# Patient Record
Sex: MALE | Race: ASIAN | NOT HISPANIC OR LATINO | Employment: FULL TIME | URBAN - METROPOLITAN AREA
[De-identification: names, ages, dates, MRNs, and addresses within clinical notes are randomized per-mention and may not be internally consistent; named-entity substitution may affect disease eponyms.]

---

## 2017-05-12 ENCOUNTER — OFFICE VISIT (OUTPATIENT)
Dept: FAMILY MEDICINE | Facility: CLINIC | Age: 37
End: 2017-05-12
Payer: COMMERCIAL

## 2017-05-12 VITALS
BODY MASS INDEX: 29.1 KG/M2 | TEMPERATURE: 98.9 F | DIASTOLIC BLOOD PRESSURE: 74 MMHG | SYSTOLIC BLOOD PRESSURE: 110 MMHG | HEIGHT: 68 IN | HEART RATE: 72 BPM | WEIGHT: 192 LBS | RESPIRATION RATE: 16 BRPM

## 2017-05-12 DIAGNOSIS — R09.A2 GLOBUS SENSATION: ICD-10-CM

## 2017-05-12 DIAGNOSIS — Z00.00 ROUTINE GENERAL MEDICAL EXAMINATION AT A HEALTH CARE FACILITY: Primary | ICD-10-CM

## 2017-05-12 DIAGNOSIS — E04.9 GOITER: ICD-10-CM

## 2017-05-12 PROBLEM — Z13.6 CARDIOVASCULAR SCREENING; LDL GOAL LESS THAN 160: Status: ACTIVE | Noted: 2017-05-12

## 2017-05-12 PROCEDURE — 84443 ASSAY THYROID STIM HORMONE: CPT | Performed by: FAMILY MEDICINE

## 2017-05-12 PROCEDURE — 83721 ASSAY OF BLOOD LIPOPROTEIN: CPT | Mod: 59 | Performed by: FAMILY MEDICINE

## 2017-05-12 PROCEDURE — 86376 MICROSOMAL ANTIBODY EACH: CPT | Performed by: FAMILY MEDICINE

## 2017-05-12 PROCEDURE — 36415 COLL VENOUS BLD VENIPUNCTURE: CPT | Performed by: FAMILY MEDICINE

## 2017-05-12 PROCEDURE — 86800 THYROGLOBULIN ANTIBODY: CPT | Performed by: FAMILY MEDICINE

## 2017-05-12 PROCEDURE — 80048 BASIC METABOLIC PNL TOTAL CA: CPT | Performed by: FAMILY MEDICINE

## 2017-05-12 PROCEDURE — 99213 OFFICE O/P EST LOW 20 MIN: CPT | Mod: 25 | Performed by: FAMILY MEDICINE

## 2017-05-12 PROCEDURE — 80061 LIPID PANEL: CPT | Performed by: FAMILY MEDICINE

## 2017-05-12 PROCEDURE — 99395 PREV VISIT EST AGE 18-39: CPT | Performed by: FAMILY MEDICINE

## 2017-05-12 NOTE — PATIENT INSTRUCTIONS
Preventive Health Recommendations  Male Ages 26 - 39    Yearly exam:             See your health care provider every year in order to  o   Review health changes.   o   Discuss preventive care.    o   Review your medicines if your doctor has prescribed any.    You should be tested each year for STDs (sexually transmitted diseases), if you re at risk.     After age 35, talk to your provider about cholesterol testing. If you are at risk for heart disease, have your cholesterol tested at least every 5 years.     If you are at risk for diabetes, you should have a diabetes test (fasting glucose).  Shots: Get a flu shot each year. Get a tetanus shot every 10 years.     Nutrition:    Eat at least 5 servings of fruits and vegetables daily.     Eat whole-grain bread, whole-wheat pasta and brown rice instead of white grains and rice.     Talk to your provider about Calcium and Vitamin D.     Lifestyle    Exercise for at least 150 minutes a week (30 minutes a day, 5 days a week). This will help you control your weight and prevent disease.     Limit alcohol to one drink per day.     No smoking.     Wear sunscreen to prevent skin cancer.     See your dentist every six months for an exam and cleaning.   30 grams fiber (3 servings vegetables /  fruits each meal)  1 serving carbohydrate (bread potatoes) daily

## 2017-05-12 NOTE — MR AVS SNAPSHOT
After Visit Summary   5/12/2017    Anival Galvez    MRN: [id number]           Patient Information     Date Of Birth          1980        Visit Information        Provider Department      5/12/2017 3:30 PM Pilo Hunter MD [facility]        Today's Diagnoses     Routine general medical examination at a health care facility    -  1    Goiter        Globus sensation          Care Instructions      Preventive Health Recommendations  Male Ages 26 - 39    Yearly exam:             See your health care provider every year in order to  o   Review health changes.   o   Discuss preventive care.    o   Review your medicines if your doctor has prescribed any.    You should be tested each year for STDs (sexually transmitted diseases), if you re at risk.     After age 35, talk to your provider about cholesterol testing. If you are at risk for heart disease, have your cholesterol tested at least every 5 years.     If you are at risk for diabetes, you should have a diabetes test (fasting glucose).  Shots: Get a flu shot each year. Get a tetanus shot every 10 years.     Nutrition:    Eat at least 5 servings of fruits and vegetables daily.     Eat whole-grain bread, whole-wheat pasta and brown rice instead of white grains and rice.     Talk to your provider about Calcium and Vitamin D.     Lifestyle    Exercise for at least 150 minutes a week (30 minutes a day, 5 days a week). This will help you control your weight and prevent disease.     Limit alcohol to one drink per day.     No smoking.     Wear sunscreen to prevent skin cancer.     See your dentist every six months for an exam and cleaning.   30 grams fiber (3 servings vegetables /  fruits each meal)  1 serving carbohydrate (bread potatoes) daily          Follow-ups after your visit        Additional Services     GASTROENTEROLOGY ADULT REF PROCEDURE ONLY       Last Lab Result: Creatinine (mg/dL)       Date                     Value                  11/07/2014               0.84             ----------  Body mass index is 29.19 kg/(m^2).     Needed:  No  Language:  English    Patient will be contacted to schedule procedure.     Please be aware that coverage of these services is subject to the terms and limitations of your health insurance plan.  Call member services at your health plan with any benefit or coverage questions.  Any procedures must be performed at a [facility] facility OR coordinated by your clinic's referral office.    Please bring the following with you to your appointment:    (1) Any X-Rays, CTs or MRIs which have been performed.  Contact the facility where they were done to arrange for  prior to your scheduled appointment.    (2) List of current medications   (3) This referral request   (4) Any documents/labs given to you for this referral                  Who to contact     If you have questions or need follow up information about today's clinic visit or your schedule please contact [facility] directly at [phone].  Normal or non-critical lab and imaging results will be communicated to you by [employer], letter or phone within 4 business days after the clinic has received the results. If you do not hear from us within 7 days, please contact the clinic through [employer] or phone. If you have a critical or abnormal lab result, we will notify you by phone as soon as possible.  Submit refill requests through [employer] or call your pharmacy and they will forward the refill request to us. Please allow 3 business days for your refill to be completed.          Additional Information About Your Visit        [employer] Information     [employer] gives you secure access to your electronic health record. If you see a primary care provider, you can also send messages to your care team and make appointments. If you have questions, please call your primary care clinic.  If you do not have a primary care provider, please  "call [phone] and they will assist you.        Care EveryWhere ID     This is your Care EveryWhere ID. This could be used by other organizations to access your [facility] medical records  [id number]        Your Vitals Were     Pulse Temperature Respirations Height BMI (Body Mass Index)       72 98.9  F (37.2  C) (Oral) 16 5' 8\" (1.727 m) 29.19 kg/m2        Blood Pressure from Last 3 Encounters:   05/12/17 110/74   09/07/16 118/64   03/24/16 125/85    Weight from Last 3 Encounters:   05/12/17 192 lb (87.1 kg)   09/07/16 190 lb 12.8 oz (86.5 kg)   03/24/16 187 lb (84.8 kg)              We Performed the Following     ANTI THYROGLOBULIN ANTIBODY     Basic metabolic panel  (Ca, Cl, CO2, Creat, Gluc, K, Na, BUN)     GASTROENTEROLOGY ADULT REF PROCEDURE ONLY     LIPID REFLEX TO DIRECT LDL PANEL     THYROID PEROXIDASE ANTIBODY     TSH with free T4 reflex          Today's Medication Changes          These changes are accurate as of: 5/12/17  3:58 PM.  If you have any questions, ask your nurse or doctor.               These medicines have changed or have updated prescriptions.        Dose/Directions    fluticasone 50 MCG/ACT spray   Commonly known as:  FLONASE   This may have changed:  Another medication with the same name was removed. Continue taking this medication, and follow the directions you see here.   Used for:  Seasonal allergic rhinitis   Changed by:  Alexsander Funes MD        Dose:  1-2 spray   Spray 1-2 sprays into both nostrils daily   Quantity:  16 g   Refills:  11         Stop taking these medicines if you haven't already. Please contact your care team if you have questions.     amoxicillin-clavulanate 875-125 MG per tablet   Commonly known as:  AUGMENTIN   Stopped by:  Pilo Hunter MD           cetirizine 10 MG tablet   Commonly known as:  zyrTEC   Stopped by:  Pilo Hunter MD                    Primary Care Provider    None Specified       No primary provider on file.        Thank you!     Thank you for " choosing [facility]  for your care. Our goal is always to provide you with excellent care. Hearing back from our patients is one way we can continue to improve our services. Please take a few minutes to complete the written survey that you may receive in the mail after your visit with us. Thank you!             Your Updated Medication List - Protect others around you: Learn how to safely use, store and throw away your medicines at www.disposemymeds.org.          This list is accurate as of: 5/12/17  3:58 PM.  Always use your most recent med list.                   Brand Name Dispense Instructions for use    fluticasone 50 MCG/ACT spray    FLONASE    16 g    Spray 1-2 sprays into both nostrils daily

## 2017-05-12 NOTE — PROGRESS NOTES
"  Globus sensation food seems to get stuck lately. He has not noted textures  ROS mo heat cold intolerance  /74 (BP Location: Right arm, Patient Position: Chair, Cuff Size: Adult Regular)  Pulse 72  Temp 98.9  F (37.2  C) (Oral)  Resp 16  Ht 5' 8\" (1.727 m)  Wt 192 lb (87.1 kg)  BMI 29.19 kg/m2  Non nodular goiter  (E04.9) Goiter  Comment: [facility] database  Plan: TSH with free T4 reflex, ANTI THYROGLOBULIN         ANTIBODY, THYROID PEROXIDASE ANTIBODY            (F45.8) Globus sensation  Comment: Discussed, referred  Plan: GASTROENTEROLOGY ADULT REF PROCEDURE ONLY          Pilo Hunter      "

## 2017-05-12 NOTE — NURSING NOTE
"Chief Complaint   Patient presents with     Physical     not fasting for labs       Initial /74 (BP Location: Right arm, Patient Position: Chair, Cuff Size: Adult Regular)  Pulse 72  Temp 98.9  F (37.2  C) (Oral)  Resp 16  Ht 5' 8\" (1.727 m)  Wt 192 lb (87.1 kg)  BMI 29.19 kg/m2 Estimated body mass index is 29.19 kg/(m^2) as calculated from the following:    Height as of this encounter: 5' 8\" (1.727 m).    Weight as of this encounter: 192 lb (87.1 kg).  Medication Reconciliation: complete   Aisha Retana CMA      "

## 2017-05-13 LAB
ANION GAP SERPL CALCULATED.3IONS-SCNC: 8 MMOL/L (ref 3–14)
BUN SERPL-MCNC: 14 MG/DL (ref 7–30)
CALCIUM SERPL-MCNC: 8.9 MG/DL (ref 8.5–10.1)
CHLORIDE SERPL-SCNC: 106 MMOL/L (ref 94–109)
CHOLEST SERPL-MCNC: 252 MG/DL
CO2 SERPL-SCNC: 25 MMOL/L (ref 20–32)
CREAT SERPL-MCNC: 0.85 MG/DL (ref 0.66–1.25)
GFR SERPL CREATININE-BSD FRML MDRD: ABNORMAL ML/MIN/1.7M2
GLUCOSE SERPL-MCNC: 111 MG/DL (ref 70–99)
HDLC SERPL-MCNC: 36 MG/DL
LDLC SERPL CALC-MCNC: ABNORMAL MG/DL
LDLC SERPL DIRECT ASSAY-MCNC: 119 MG/DL
NONHDLC SERPL-MCNC: 216 MG/DL
POTASSIUM SERPL-SCNC: 3.8 MMOL/L (ref 3.4–5.3)
SODIUM SERPL-SCNC: 139 MMOL/L (ref 133–144)
TRIGL SERPL-MCNC: 810 MG/DL
TSH SERPL DL<=0.005 MIU/L-ACNC: 1.22 MU/L (ref 0.4–4)

## 2017-05-15 LAB
THYROGLOB AB SERPL IA-ACNC: <20 IU/ML (ref 0–40)
THYROPEROXIDASE AB SERPL-ACNC: 18 IU/ML

## 2017-05-15 NOTE — PROGRESS NOTES
Thyroid FUNCTION is OK. I would check that yearly. Triglycerides are high. We do not treat at this level. I would check every few years, though. This is associated with diabetes risk  Pilo Hunter MD

## 2017-05-30 ENCOUNTER — TELEPHONE (OUTPATIENT)
Dept: FAMILY MEDICINE | Facility: CLINIC | Age: 37
End: 2017-05-30

## 2017-05-30 NOTE — TELEPHONE ENCOUNTER
Third attempt. Not Scheduled At [facility]. Patient does not want procedure at this time.

## 2017-06-22 DIAGNOSIS — J30.2 SEASONAL ALLERGIC RHINITIS: ICD-10-CM

## 2017-06-23 NOTE — TELEPHONE ENCOUNTER
fluticasone (FLONASE) 50 MCG/ACT nasal spray      Last Written Prescription Date:3/24/16  Last Fill Quantity: 16g,  # refills: 11   Last Office Visit with ZITA, JUDAH or [facility] prescribing provider:   antony 5/12/17

## 2017-06-24 RX ORDER — FLUTICASONE PROPIONATE 50 MCG
SPRAY, SUSPENSION (ML) NASAL
Qty: 16 ML | Refills: 3 | Status: SHIPPED | OUTPATIENT
Start: 2017-06-24 | End: 2018-03-23

## 2017-06-24 NOTE — TELEPHONE ENCOUNTER
Prescription approved per [facility] Refill Protocol  Chelsy Frankel RN BS

## 2018-01-19 ENCOUNTER — OFFICE VISIT (OUTPATIENT)
Dept: FAMILY MEDICINE | Facility: CLINIC | Age: 38
End: 2018-01-19
Payer: COMMERCIAL

## 2018-01-19 VITALS
HEART RATE: 88 BPM | HEIGHT: 68 IN | TEMPERATURE: 98.6 F | SYSTOLIC BLOOD PRESSURE: 128 MMHG | DIASTOLIC BLOOD PRESSURE: 81 MMHG | BODY MASS INDEX: 29.25 KG/M2 | WEIGHT: 193 LBS | OXYGEN SATURATION: 95 %

## 2018-01-19 DIAGNOSIS — J20.9 ACUTE BRONCHITIS, UNSPECIFIED ORGANISM: Primary | ICD-10-CM

## 2018-01-19 PROCEDURE — 99213 OFFICE O/P EST LOW 20 MIN: CPT | Performed by: PHYSICIAN ASSISTANT

## 2018-01-19 RX ORDER — PREDNISONE 20 MG/1
40 TABLET ORAL DAILY
Qty: 10 TABLET | Refills: 0 | Status: SHIPPED | OUTPATIENT
Start: 2018-01-19 | End: 2018-01-24

## 2018-01-19 RX ORDER — ALBUTEROL SULFATE 90 UG/1
2 AEROSOL, METERED RESPIRATORY (INHALATION) EVERY 6 HOURS PRN
Qty: 1 INHALER | Refills: 0 | Status: SHIPPED | OUTPATIENT
Start: 2018-01-19 | End: 2019-12-06

## 2018-01-19 NOTE — PATIENT INSTRUCTIONS

## 2018-01-19 NOTE — PROGRESS NOTES
SUBJECTIVE:   Anival Galvez is a 37 year old male who presents to clinic today for the following health issues:      Acute Illness   Acute illness concerns: Cough   Onset: X 6 days    Fever: no     Chills/Sweats: no     Headache (location?): YES    Sinus Pressure:YES    Conjunctivitis:  no    Ear Pain: no    Rhinorrhea: no     Congestion: YES    Sore Throat: YES- slight     Cough: YES-productive of green sputum    Wheeze: YES    Decreased Appetite: no     Nausea: no     Vomiting: no     Diarrhea:  no     Dysuria/Freq.: no     Fatigue/Achiness: no     Sick/Strep Exposure: no      Therapies Tried and outcome: Cough syrup, this did not help, cough drops for comfort        Problem list and histories reviewed & adjusted, as indicated.  Additional history: as documented    Patient Active Problem List   Diagnosis     Seasonal allergic rhinitis     Osgood-Schlatter's disease     CARDIOVASCULAR SCREENING; LDL GOAL LESS THAN 160     Goiter     Globus sensation     Past Surgical History:   Procedure Laterality Date     wisdom teeth         Social History   Substance Use Topics     Smoking status: Former Smoker     Smokeless tobacco: Never Used     Alcohol use 0.0 oz/week     0 Standard drinks or equivalent per week      Comment: seldom     Family History   Problem Relation Age of Onset     Family History Negative Mother      Family History Negative Father          Current Outpatient Prescriptions   Medication Sig Dispense Refill     predniSONE (DELTASONE) 20 MG tablet Take 2 tablets (40 mg) by mouth daily for 5 days 10 tablet 0     albuterol (PROAIR HFA/PROVENTIL HFA/VENTOLIN HFA) 108 (90 BASE) MCG/ACT Inhaler Inhale 2 puffs into the lungs every 6 hours as needed for shortness of breath / dyspnea or wheezing 1 Inhaler 0     fluticasone (FLONASE) 50 MCG/ACT spray USE ONE OR TWO SPRAYS IN EACH NOSTRIL DAILY 16 mL 3     No Known Allergies  BP Readings from Last 3 Encounters:   01/19/18 128/81   05/12/17 110/74   09/07/16  "118/64    Wt Readings from Last 3 Encounters:   01/19/18 193 lb (87.5 kg)   05/12/17 192 lb (87.1 kg)   09/07/16 190 lb 12.8 oz (86.5 kg)                        Reviewed and updated as needed this visit by clinical staffTobacco  Allergies  Meds  Problems  Med Hx  Surg Hx  Fam Hx  Soc Hx        Reviewed and updated as needed this visit by Provider  Allergies  Meds  Problems         ROS:  Constitutional, HEENT, cardiovascular, pulmonary, gi and gu systems are negative, except as otherwise noted.      OBJECTIVE:   /81 (BP Location: Right arm, Patient Position: Chair, Cuff Size: Adult Large)  Pulse 88  Temp 98.6  F (37  C) (Oral)  Ht 5' 8\" (1.727 m)  Wt 193 lb (87.5 kg)  SpO2 95%  BMI 29.35 kg/m2  Body mass index is 29.35 kg/(m^2).  GENERAL: alert and no distress  EYES: Eyes grossly normal to inspection, PERRL and conjunctivae and sclerae normal  HENT: normal cephalic/atraumatic, both ears: clear effusion, nasal mucosa edematous , oropharynx clear, oral mucous membranes moist and sinuses: not tender  RESP: no rales , no rhonchi and inspiratory wheezes intermittent throughout  CV: regular rates and rhythm, normal S1 S2, no S3 or S4 and no murmur, click or rub  PSYCH: mentation appears normal, affect normal/bright    Diagnostic Test Results:  CXR - negative    ASSESSMENT/PLAN:     (J20.9) Acute bronchitis, unspecified organism  (primary encounter diagnosis)  Comment: CXR obtained due to decreased sats. Negative for infiltrate. Likely viral, however given wheezing and decreased sats, steroid burst will be used as well as albuterol prn for wheezing. If no improvement in 5 days, patient should RTC. Sooner if worsening.   Plan: predniSONE (DELTASONE) 20 MG tablet, albuterol         (PROAIR HFA/PROVENTIL HFA/VENTOLIN HFA) 108 (90        BASE) MCG/ACT Inhaler        -Medication use and side effects discussed with the patient. Patient is in complete understanding and agreement with plan.         Follow up: " as above     Be Nelson PA-C  [facility]

## 2018-01-19 NOTE — MR AVS SNAPSHOT
After Visit Summary   1/19/2018    Anival Galvez    MRN: [id number]           Patient Information     Date Of Birth          1980        Visit Information        Provider Department      1/19/2018 8:30 AM Be Nelson PA-C [facility]        Today's Diagnoses     Acute bronchitis, unspecified organism    -  1      Care Instructions      Bronchitis, Viral (Adult)    You have a viral bronchitis. Bronchitis is inflammation and swelling of the lining of the lungs. This is often caused by an infection. Symptoms include a dry, hacking cough that is worse at night. The cough may bring up yellow-green mucus. You may also feel short of breath or wheeze. Other symptoms may include tiredness, chest discomfort, and chills.  Bronchitis that is caused by a virus is not treated with antibiotics. Instead, medicines may be given to help relieve symptoms. Symptoms can last up to 2 weeks, although the cough may last much longer.  This illness is contagious during the first few days and is spread through the air by coughing and sneezing, or by direct contact (touching the sick person and then touching your own eyes, nose, or mouth).  Most viral illnesses resolve within 10 to 14 days with rest and simple home remedies, although they may sometimes last for several weeks.  Home care    If symptoms are severe, rest at home for the first 2 to 3 days. When you go back to your usual activities, don't let yourself get too tired.    Do not smoke. Also avoid being exposed to secondhand smoke.    You may use over-the-counter medicine to control fever or pain, unless another pain medicine was prescribed. (Note: If you have chronic liver or kidney disease or have ever had a stomach ulcer or gastrointestinal bleeding, talk with your healthcare provider before using these medicines. Also talk to your provider if you are taking medicine to prevent blood clots.) Aspirin should never be given to anyone  younger than 18 years of age who is ill with a viral infection or fever. It may cause severe liver or brain damage.    Your appetite may be poor, so a light diet is fine. Avoid dehydration by drinking 6 to 8 glasses of fluids per day (such as water, soft drinks, sports drinks, juices, tea, or soup). Extra fluids will help loosen secretions in the nose and lungs.    Over-the-counter cough, cold, and sore-throat medicines will not shorten the length of the illness, but they may help to reduce symptoms. (Note: Do not use decongestants if you have high blood pressure.)  Follow-up care  Follow up with your healthcare provider, or as advised. If you had an X-ray or ECG (electrocardiogram), a specialist will review it. You will be notified of any new findings that may affect your care.  Note: If you are age 65 or older, or if you have a chronic lung disease or condition that affects your immune system, or you smoke, talk to your healthcare provider about having pneumococcal vaccinations and a yearly influenza vaccination (flu shot).  When to seek medical advice  Call your healthcare provider right away if any of these occur:    Fever of 100.4 F (38 C) or higher    Coughing up increased amounts of colored sputum    Weakness, drowsiness, headache, facial pain, ear pain, or a stiff neck  Call 911, or get immediate medical care  Contact emergency services right away if any of these occur:    Coughing up blood    Worsening weakness, drowsiness, headache, or stiff neck    Trouble breathing, wheezing, or pain with breathing  Date Last Reviewed: 9/13/2015 2000-2017 [employer]. [address] [place] [address]. All rights reserved. This information is not intended as a substitute for professional medical care. Always follow your healthcare professional's instructions.                Follow-ups after your visit        Who to contact     If you have questions or need follow up information about today's  "clinic visit or your schedule please contact [facility] directly at [phone].  Normal or non-critical lab and imaging results will be communicated to you by [employer], letter or phone within 4 business days after the clinic has received the results. If you do not hear from us within 7 days, please contact the clinic through [employer] or phone. If you have a critical or abnormal lab result, we will notify you by phone as soon as possible.  Submit refill requests through [employer] or call your pharmacy and they will forward the refill request to us. Please allow 3 business days for your refill to be completed.          Additional Information About Your Visit        [employer] Information     [employer] gives you secure access to your electronic health record. If you see a primary care provider, you can also send messages to your care team and make appointments. If you have questions, please call your primary care clinic.  If you do not have a primary care provider, please call [phone] and they will assist you.        Care EveryWhere ID     This is your Care EveryWhere ID. This could be used by other organizations to access your [facility] medical records  [id number]        Your Vitals Were     Pulse Temperature Height Pulse Oximetry BMI (Body Mass Index)       88 98.6  F (37  C) (Oral) 5' 8\" (1.727 m) 95% 29.35 kg/m2        Blood Pressure from Last 3 Encounters:   01/19/18 128/81   05/12/17 110/74   09/07/16 118/64    Weight from Last 3 Encounters:   01/19/18 193 lb (87.5 kg)   05/12/17 192 lb (87.1 kg)   09/07/16 190 lb 12.8 oz (86.5 kg)              Today, you had the following     No orders found for display         Today's Medication Changes          These changes are accurate as of: 1/19/18  8:57 AM.  If you have any questions, ask your nurse or doctor.               Start taking these medicines.        Dose/Directions    albuterol 108 (90 BASE) MCG/ACT Inhaler   Commonly known as:  PROAIR " HFA/PROVENTIL HFA/VENTOLIN HFA   Used for:  Acute bronchitis, unspecified organism   Started by:  Be Nelson PA-C        Dose:  2 puff   Inhale 2 puffs into the lungs every 6 hours as needed for shortness of breath / dyspnea or wheezing   Quantity:  1 Inhaler   Refills:  0       predniSONE 20 MG tablet   Commonly known as:  DELTASONE   Used for:  Acute bronchitis, unspecified organism   Started by:  Be Nelson PA-C        Dose:  40 mg   Take 2 tablets (40 mg) by mouth daily for 5 days   Quantity:  10 tablet   Refills:  0            Where to get your medicines      These medications were sent to [facility] [address] [place] [facility] [place] - [address]  [address] [facility] [address]     Phone:  [phone]     albuterol 108 (90 BASE) MCG/ACT Inhaler    predniSONE 20 MG tablet                Primary Care Provider Fax #    Physician No Ref-Primary [phone]       No address on file        Equal Access to Services     [facility] : Hadii rohan jacobo Sokeysha, waaxda luqadaha, qaybta kaalmada adegueritayagarcía, mauricio cruz . So [facility] [phone].    ATENCIÓN: Si habla español, tiene a elkins disposición servicios gratuitos de asistencia lingüística. Llame al [phone].    We comply with applicable federal civil rights laws and [place] laws. We do not discriminate on the basis of race, color, national origin, age, disability, sex, sexual orientation, or gender identity.            Thank you!     Thank you for choosing [facility]  for your care. Our goal is always to provide you with excellent care. Hearing back from our patients is one way we can continue to improve our services. Please take a few minutes to complete the written survey that you may receive in the mail after your visit with us. Thank you!             Your Updated Medication List - Protect others around you: Learn how to safely use, store and throw away your medicines at  www.disposemymeds.org.          This list is accurate as of: 1/19/18  8:57 AM.  Always use your most recent med list.                   Brand Name Dispense Instructions for use Diagnosis    albuterol 108 (90 BASE) MCG/ACT Inhaler    PROAIR HFA/PROVENTIL HFA/VENTOLIN HFA    1 Inhaler    Inhale 2 puffs into the lungs every 6 hours as needed for shortness of breath / dyspnea or wheezing    Acute bronchitis, unspecified organism       fluticasone 50 MCG/ACT spray    FLONASE    16 mL    USE ONE OR TWO SPRAYS IN EACH NOSTRIL DAILY    Seasonal allergic rhinitis       predniSONE 20 MG tablet    DELTASONE    10 tablet    Take 2 tablets (40 mg) by mouth daily for 5 days    Acute bronchitis, unspecified organism

## 2018-01-19 NOTE — NURSING NOTE
"Chief Complaint   Patient presents with     Cough     suspects bronchitis X 6 days       Initial /81 (BP Location: Right arm, Patient Position: Chair, Cuff Size: Adult Large)  Pulse 88  Temp 98.6  F (37  C) (Oral)  Ht 5' 8\" (1.727 m)  Wt 193 lb (87.5 kg)  SpO2 95%  BMI 29.35 kg/m2 Estimated body mass index is 29.35 kg/(m^2) as calculated from the following:    Height as of this encounter: 5' 8\" (1.727 m).    Weight as of this encounter: 193 lb (87.5 kg).  Medication Reconciliation: complete   Galileo Carballo MA      "

## 2018-03-23 DIAGNOSIS — J30.2 SEASONAL ALLERGIC RHINITIS: ICD-10-CM

## 2018-03-23 RX ORDER — FLUTICASONE PROPIONATE 50 MCG
SPRAY, SUSPENSION (ML) NASAL
Qty: 16 ML | Refills: 8 | Status: SHIPPED | OUTPATIENT
Start: 2018-03-23 | End: 2019-12-06

## 2018-06-22 ENCOUNTER — OFFICE VISIT (OUTPATIENT)
Dept: ORTHOPEDICS | Facility: CLINIC | Age: 38
End: 2018-06-22
Payer: COMMERCIAL

## 2018-06-22 ENCOUNTER — RADIANT APPOINTMENT (OUTPATIENT)
Dept: GENERAL RADIOLOGY | Facility: CLINIC | Age: 38
End: 2018-06-22
Attending: FAMILY MEDICINE
Payer: COMMERCIAL

## 2018-06-22 VITALS
SYSTOLIC BLOOD PRESSURE: 128 MMHG | DIASTOLIC BLOOD PRESSURE: 80 MMHG | BODY MASS INDEX: 28.79 KG/M2 | WEIGHT: 190 LBS | HEIGHT: 68 IN

## 2018-06-22 DIAGNOSIS — M25.561 ARTHRALGIA OF RIGHT LOWER LEG: ICD-10-CM

## 2018-06-22 DIAGNOSIS — M22.2X1 PATELLOFEMORAL PAIN SYNDROME OF RIGHT KNEE: ICD-10-CM

## 2018-06-22 DIAGNOSIS — M22.41 CHONDROMALACIA OF RIGHT PATELLA: ICD-10-CM

## 2018-06-22 DIAGNOSIS — M25.561 ARTHRALGIA OF RIGHT LOWER LEG: Primary | ICD-10-CM

## 2018-06-22 DIAGNOSIS — R29.898 WEAKNESS OF RIGHT HIP: ICD-10-CM

## 2018-06-22 PROCEDURE — 73562 X-RAY EXAM OF KNEE 3: CPT

## 2018-06-22 PROCEDURE — 99203 OFFICE O/P NEW LOW 30 MIN: CPT | Performed by: FAMILY MEDICINE

## 2018-06-22 NOTE — LETTER
6/22/2018         RE: Anival Galvez  [address] [facility] [address]        Dear Colleague,    Thank you for referring your patient, Anival Galvez, to the [facility] SPORTS MEDICINE. Please see a copy of my visit note below.    ASSESSMENT & PLAN    1. Arthralgia of right lower leg    2. Chondromalacia of right patella    3. Patellofemoral pain syndrome of right knee    4. Weakness of right hip      Discussed xray - no significant arthritis  Discussed exam - pain due to weakness in thigh, hip and buttock. Knee is otherwise stable and no fluid in the joint  Activity modification as discussed - squats, lunges, stair, sit-to-stand and kneeling will bother you  Physical therapy: [facility] - [phone]  Can take up to 8 weeks to change your strength  Respect 6/10 pain - before, during, after  Would encourage you to try and start a regular exercise program: minimum of 150 min of moderate intensity exercise and 2 days of weight training    Follow up after 8 PT sessions.    -----    SUBJECTIVE  Anival Galvez is a/an 37 year old male who is seen as a self referral for evaluation of right knee pain. The patient is seen by themselves.    Onset: 1 week(s) ago. Reports insidious onset without acute precipitating event.  Location of Pain: right anterior knee- peripatellar region  Rating of Pain at worst: 4/10  Rating of Pain Currently: 0/10  Worsened by: going down stairs, twisting  Better with: massage  Treatments tried: ice and ibuprofen  Associated symptoms: swelling and crepitus  Orthopedic history:  osgood-schlatter's  Relevant surgical history: NO  Patient Social History: works as an     Exercise: walking but not for exercise    Patient's past medical, surgical, social, and family histories were reviewed today and no changes are noted.    REVIEW OF SYSTEMS:  10 point ROS is negative other than symptoms noted above in HPI, Past Medical History or as stated  "below  Constitutional: NEGATIVE for fever, chills, change in weight  Skin: NEGATIVE for worrisome rashes, moles or lesions  GI/: NEGATIVE for bowel or bladder changes  Neuro: NEGATIVE for weakness, dizziness or paresthesias    OBJECTIVE:  /80  Ht 5' 8\" (1.727 m)  Wt 190 lb (86.2 kg)  BMI 28.89 kg/m2   General: healthy, alert and in no distress  HEENT: no scleral icterus or conjunctival erythema  Skin: no suspicious lesions or rash. No jaundice.  CV: no pedal edema  Resp: normal respiratory effort without conversational dyspnea   Psych: normal mood and affect  Gait: normal steady gait with appropriate coordination and balance  Neuro: Normal light sensory exam of lower extremity  MSK:  RIGHT KNEE  Inspection:    normal alignment, bony hypertrophy of the bilateral tibial tuberosity.  Palpation:    Tender about the lateral patellar facet and medial patellar facet.  Nontender over the patella tendon and tibial tuberosity.  Remainder of bony and ligamentous landmarks are nontender.    No effusion is present    Patellofemoral crepitus is Present  Range of Motion:     00 extension to 1350 flexion  Strength:    Quadriceps and gluteus weakness    Extensor mechanism intact  Special Tests:    Positive: Patellar grind    Negative: MCL/valgus stress (0 & 30 deg), LCL/varus stress (0 & 30 deg), Lachman's, posterior drawer, Halina's    Independent visualization of the below image:  KNEE STANDING AP BILATERAL, SUNRISE BILATERAL, LATERAL RIGHT 6/22/2018  2:06 PM      HISTORY: Arthralgia of right lower leg.     COMPARISON: None.         IMPRESSION: No acute bony abnormality. Fragmented tibial tuberosity  consistent with old Osgood-Schlatter's disease. No joint space  effusion or significant degenerative changes. Two views of the left  knee are included and also show fragmented tibial tuberosity related  to old Osgood-Schlatter's disease but no acute abnormality.     LIGIA MONTANEZ MD    Patient's conditions were " thoroughly discussed during today's visit with greater than 50% of the visit spent counseling the patient with total time spent face-to-face with the patient being 20 minutes.    Dino Muñoz DO [facility]      Again, thank you for allowing me to participate in the care of your patient.        Sincerely,        Dino Muñoz, DO

## 2018-06-22 NOTE — MR AVS SNAPSHOT
After Visit Summary   6/22/2018    Anival Galvez    MRN: [id number]           Patient Information     Date Of Birth          1980        Visit Information        Provider Department      6/22/2018 1:40 PM Dino Muñoz, DO [facility] SP[facility]TS MEDICINE        Today's Diagnoses     Arthralgia of right lower leg    -  1    Chondromalacia of right patella        Patellofemoral pain syndrome of right knee        Weakness of right hip          Care Instructions    1. Arthralgia of right lower leg    2. Chondromalacia of right patella    3. Patellofemoral pain syndrome of right knee    4. Weakness of right hip      Discussed xray - no significant arthritis  Discussed exam - pain due to weakness in thigh, hip and buttock. Knee is otherwise stable and no fluid in the joint  Activity modification as discussed - squats, lunges, stair, sit-to-stand and kneeling will bother you  Physical therapy: [facility] - [phone]  Can take up to 8 weeks to change your strength  Respect 6/10 pain - before, during, after  Would encourage you to try and start a regular exercise program: minimum of 150 min of moderate intensity exercise and 2 days of weight training    Follow up after 8 PT sessions.          Follow-ups after your visit        Additional Services     [facility] PT, HAND, AND CHIROPRACTIC REFERRAL       **This order will print in the [facility] Scheduling Office**    Physical Therapy, Hand Therapy and Chiropractic Care are available through:    *[facility]  *[facility]  *[facility]    Call one number to schedule at any of the above locations: [phone].    Your provider has referred you to: Physical Therapy at [facility] or [facility]    Indication/Reason for Referral: Right PFPS/Chondromalacia patella  Onset of Illness: see chart  Therapy Orders: Evaluate and Treat  Special Programs: None  Special Request: kinesioptaping.     Milton Rose       Additional Comments for the Therapist or Chiropractor: Formal physical therapy - exercises to include neuromuscular/proprioceptive control and VMO/glutues/adductor strengthening. Please also include pain-free closed chain/isometric/isotonic strengthening with use of modalities (including kinesioptaping) as needed/deemed appropriate with home exercise prescription.      Please be aware that coverage of these services is subject to the terms and limitations of your health insurance plan.  Call member services at your health plan with any benefit or coverage questions.      Please bring the following to your appointment:    *Your personal calendar for scheduling future appointments  *Comfortable clothing                  Who to contact     If you have questions or need follow up information about today's clinic visit or your schedule please contact [facility] SPORTS MEDICINE directly at [phone].  Normal or non-critical lab and imaging results will be communicated to you by [employer], letter or phone within 4 business days after the clinic has received the results. If you do not hear from us within 7 days, please contact the clinic through [employer] or phone. If you have a critical or abnormal lab result, we will notify you by phone as soon as possible.  Submit refill requests through [employer]er or call your pharmacy and they will forward the refill request to us. Please allow 3 business days for your refill to be completed.          Additional Information About Your Visit        Miner Information     Miner gives you secure access to your electronic health record. If you see a primary care provider, you can also send messages to your care team and make appointments. If you have questions, please call your primary care clinic.  If you do not have a primary care provider, please call [phone] and they will assist you.        Care EveryWhere ID     This is your Care EveryWhere ID. This could be used by other  "organizations to access your [facility] medical records  [id number]        Your Vitals Were     Height BMI (Body Mass Index)                5' 8\" (1.727 m) 28.89 kg/m2           Blood Pressure from Last 3 Encounters:   06/22/18 128/80   01/19/18 128/81   05/12/17 110/74    Weight from Last 3 Encounters:   06/22/18 190 lb (86.2 kg)   01/19/18 193 lb (87.5 kg)   05/12/17 192 lb (87.1 kg)              We Performed the Following     ISMAEL PT, HAND, AND CHIROPRACTIC REFERRAL        Primary Care Provider Fax #    Physician No Ref-Primary [phone]       No address on file        Equal Access to Services     STEPHANIE MAYORGA : Maurice Kirkpatrick, edgar caal, reed bernalmagarcía jenkins, mauricio cruz . So [facility] [phone].    ATENCIÓN: Si habla español, tiene a elkins disposición servicios gratuitos de asistencia lingüística. Llame al [phone].    We comply with applicable federal civil rights laws and [place] laws. We do not discriminate on the basis of race, color, national origin, age, disability, sex, sexual orientation, or gender identity.            Thank you!     Thank you for choosing [facility] SP[facility]TS [facility]  for your care. Our goal is always to provide you with excellent care. Hearing back from our patients is one way we can continue to improve our services. Please take a few minutes to complete the written survey that you may receive in the mail after your visit with us. Thank you!             Your Updated Medication List - Protect others around you: Learn how to safely use, store and throw away your medicines at www.disposemymeds.org.          This list is accurate as of 6/22/18  2:17 PM.  Always use your most recent med list.                   Brand Name Dispense Instructions for use Diagnosis    albuterol 108 (90 Base) MCG/ACT Inhaler    PROAIR HFA/PROVENTIL HFA/VENTOLIN HFA    1 Inhaler    Inhale 2 puffs into the lungs every 6 hours as needed for shortness of " breath / dyspnea or wheezing    Acute bronchitis, unspecified organism       fluticasone 50 MCG/ACT spray    FLONASE    16 mL    USE ONE OR TWO SPRAYS IN EACH NOSTRIL DAILY    Seasonal allergic rhinitis

## 2018-06-22 NOTE — PROGRESS NOTES
"ASSESSMENT & PLAN    1. Arthralgia of right lower leg    2. Chondromalacia of right patella    3. Patellofemoral pain syndrome of right knee    4. Weakness of right hip      Discussed xray - no significant arthritis  Discussed exam - pain due to weakness in thigh, hip and buttock. Knee is otherwise stable and no fluid in the joint  Activity modification as discussed - squats, lunges, stair, sit-to-stand and kneeling will bother you  Physical therapy: [facility] - [phone]  Can take up to 8 weeks to change your strength  Respect 6/10 pain - before, during, after  Would encourage you to try and start a regular exercise program: minimum of 150 min of moderate intensity exercise and 2 days of weight training    Follow up after 8 PT sessions.    -----    SUBJECTIVE  Anival KATE Lenny is a/an 37 year old male who is seen as a self referral for evaluation of right knee pain. The patient is seen by themselves.    Onset: 1 week(s) ago. Reports insidious onset without acute precipitating event.  Location of Pain: right anterior knee- peripatellar region  Rating of Pain at worst: 4/10  Rating of Pain Currently: 0/10  Worsened by: going down stairs, twisting  Better with: massage  Treatments tried: ice and ibuprofen  Associated symptoms: swelling and crepitus  Orthopedic history:  osgood-schlatter's  Relevant surgical history: NO  Patient Social History: works as an     Exercise: walking but not for exercise    Patient's past medical, surgical, social, and family histories were reviewed today and no changes are noted.    REVIEW OF SYSTEMS:  10 point ROS is negative other than symptoms noted above in HPI, Past Medical History or as stated below  Constitutional: NEGATIVE for fever, chills, change in weight  Skin: NEGATIVE for worrisome rashes, moles or lesions  GI/: NEGATIVE for bowel or bladder changes  Neuro: NEGATIVE for weakness, dizziness or paresthesias    OBJECTIVE:  /80  Ht 5' 8\" " (1.727 m)  Wt 190 lb (86.2 kg)  BMI 28.89 kg/m2   General: healthy, alert and in no distress  HEENT: no scleral icterus or conjunctival erythema  Skin: no suspicious lesions or rash. No jaundice.  CV: no pedal edema  Resp: normal respiratory effort without conversational dyspnea   Psych: normal mood and affect  Gait: normal steady gait with appropriate coordination and balance  Neuro: Normal light sensory exam of lower extremity  MSK:  RIGHT KNEE  Inspection:    normal alignment, bony hypertrophy of the bilateral tibial tuberosity.  Palpation:    Tender about the lateral patellar facet and medial patellar facet.  Nontender over the patella tendon and tibial tuberosity.  Remainder of bony and ligamentous landmarks are nontender.    No effusion is present    Patellofemoral crepitus is Present  Range of Motion:     00 extension to 1350 flexion  Strength:    Quadriceps and gluteus weakness    Extensor mechanism intact  Special Tests:    Positive: Patellar grind    Negative: MCL/valgus stress (0 & 30 deg), LCL/varus stress (0 & 30 deg), Lachman's, posterior drawer, Halina's    Independent visualization of the below image:  KNEE STANDING AP BILATERAL, SUNRISE BILATERAL, LATERAL RIGHT 6/22/2018  2:06 PM      HISTORY: Arthralgia of right lower leg.     COMPARISON: None.         IMPRESSION: No acute bony abnormality. Fragmented tibial tuberosity  consistent with old Osgood-Schlatter's disease. No joint space  effusion or significant degenerative changes. Two views of the left  knee are included and also show fragmented tibial tuberosity related  to old Osgood-Schlatter's disease but no acute abnormality.     LIGIA MONTANEZ MD    Patient's conditions were thoroughly discussed during today's visit with greater than 50% of the visit spent counseling the patient with total time spent face-to-face with the patient being 20 minutes.    Dino Muñoz, DO [facility]opedic [facility]

## 2018-06-22 NOTE — PATIENT INSTRUCTIONS
1. Arthralgia of right lower leg    2. Chondromalacia of right patella    3. Patellofemoral pain syndrome of right knee    4. Weakness of right hip      Discussed xray - no significant arthritis  Discussed exam - pain due to weakness in thigh, hip and buttock. Knee is otherwise stable and no fluid in the joint  Activity modification as discussed - squats, lunges, stair, sit-to-stand and kneeling will bother you  Physical therapy: [place] [facility] - [phone]  Can take up to 8 weeks to change your strength  Respect 6/10 pain - before, during, after  Would encourage you to try and start a regular exercise program: minimum of 150 min of moderate intensity exercise and 2 days of weight training    Follow up after 8 PT sessions.

## 2018-08-27 ENCOUNTER — OFFICE VISIT (OUTPATIENT)
Dept: INTERNAL MEDICINE | Facility: CLINIC | Age: 38
End: 2018-08-27
Payer: COMMERCIAL

## 2018-08-27 VITALS
OXYGEN SATURATION: 96 % | BODY MASS INDEX: 28.95 KG/M2 | SYSTOLIC BLOOD PRESSURE: 124 MMHG | TEMPERATURE: 98 F | HEIGHT: 68 IN | HEART RATE: 93 BPM | WEIGHT: 191 LBS | DIASTOLIC BLOOD PRESSURE: 80 MMHG

## 2018-08-27 DIAGNOSIS — K21.9 GASTROESOPHAGEAL REFLUX DISEASE, ESOPHAGITIS PRESENCE NOT SPECIFIED: ICD-10-CM

## 2018-08-27 DIAGNOSIS — R53.83 FATIGUE, UNSPECIFIED TYPE: ICD-10-CM

## 2018-08-27 DIAGNOSIS — Z00.00 ROUTINE GENERAL MEDICAL EXAMINATION AT A HEALTH CARE FACILITY: Primary | ICD-10-CM

## 2018-08-27 DIAGNOSIS — R06.83 SNORING: ICD-10-CM

## 2018-08-27 PROCEDURE — 99385 PREV VISIT NEW AGE 18-39: CPT | Performed by: INTERNAL MEDICINE

## 2018-08-27 NOTE — MR AVS SNAPSHOT
After Visit Summary   8/27/2018    Anival Galvez    MRN: [id number]           Patient Information     Date Of Birth          1980        Visit Information        Provider Department      8/27/2018 10:00 AM Migue Machuca MD [facility]        Today's Diagnoses     Routine general medical examination at a health care facility    -  1    Snoring        Fatigue, unspecified type        Gastroesophageal reflux disease, esophagitis presence not specified           Follow-ups after your visit        Additional Services     SLEEP EVALUATION & MANAGEMENT REFERRAL - [facility]  [phone] (Age 18 and up)       Please be aware that coverage of these services is subject to the terms and limitations of your health insurance plan.  Call member services at your health plan with any benefit or coverage questions.      Please bring the following to your appointment:    >>   List of current medications   >>   This referral request   >>   Any documents/labs given to you for this referral                      Future tests that were ordered for you today     Open Future Orders        Priority Expected Expires Ordered    H Pylori antigen stool Routine  9/26/2018 8/27/2018    Lipid panel reflex to direct LDL Fasting Routine 8/27/2018 8/27/2019 8/27/2018    **TSH with free T4 reflex FUTURE anytime Routine 8/27/2018 8/27/2019 8/27/2018    **CBC with platelets FUTURE anytime Routine 8/27/2018 8/27/2019 8/27/2018    **Comprehensive metabolic panel FUTURE anytime Routine 8/27/2018 8/27/2019 8/27/2018    SLEEP EVALUATION & MANAGEMENT REFERRAL - ADULT -[facility]  [phone] (Age 18 and up) Routine  8/27/2019 8/27/2018            Who to contact     If you have questions or need follow up information about today's clinic visit or your schedule please contact [facility] directly at [phone].  Normal or non-critical lab and  "imaging results will be communicated to you by MyChart, letter or phone within 4 business days after the clinic has received the results. If you do not hear from us within 7 days, please contact the clinic through [employer] or phone. If you have a critical or abnormal lab result, we will notify you by phone as soon as possible.  Submit refill requests through Appoet or call your pharmacy and they will forward the refill request to us. Please allow 3 business days for your refill to be completed.          Additional Information About Your Visit        [employer] Information     Appoet gives you secure access to your electronic health record. If you see a primary care provider, you can also send messages to your care team and make appointments. If you have questions, please call your primary care clinic.  If you do not have a primary care provider, please call [phone] and they will assist you.        Care EveryWhere ID     This is your Care EveryWhere ID. This could be used by other organizations to access your [facility] medical records  [id number]        Your Vitals Were     Pulse Temperature Height Pulse Oximetry BMI (Body Mass Index)       93 98  F (36.7  C) (Oral) 5' 7.75\" (1.721 m) 96% 29.26 kg/m2        Blood Pressure from Last 3 Encounters:   08/27/18 124/80   06/22/18 128/80   01/19/18 128/81    Weight from Last 3 Encounters:   08/27/18 191 lb (86.6 kg)   06/22/18 190 lb (86.2 kg)   01/19/18 193 lb (87.5 kg)               Primary Care Provider Fax #    Physician No Ref-Primary [phone]       No address on file        Equal Access to Services     [facility]: Hadii rohan rodriguez Sokeysha, waaxda luqadaha, qaybta kaalmada mauricio jenkins. So [facility] [phone].    ATENCIÓN: Si habla español, tiene a elkins disposición servicios gratuitos de asistencia lingüística. Llame al [phone].    We comply with applicable federal civil rights laws and [place] laws. We do not " discriminate on the basis of race, color, national origin, age, disability, sex, sexual orientation, or gender identity.            Thank you!     Thank you for choosing [facility]  for your care. Our goal is always to provide you with excellent care. Hearing back from our patients is one way we can continue to improve our services. Please take a few minutes to complete the written survey that you may receive in the mail after your visit with us. Thank you!             Your Updated Medication List - Protect others around you: Learn how to safely use, store and throw away your medicines at www.disposemymeds.org.          This list is accurate as of 8/27/18 10:35 AM.  Always use your most recent med list.                   Brand Name Dispense Instructions for use Diagnosis    albuterol 108 (90 Base) MCG/ACT inhaler    PROAIR HFA/PROVENTIL HFA/VENTOLIN HFA    1 Inhaler    Inhale 2 puffs into the lungs every 6 hours as needed for shortness of breath / dyspnea or wheezing    Acute bronchitis, unspecified organism       fluticasone 50 MCG/ACT spray    FLONASE    16 mL    USE ONE OR TWO SPRAYS IN EACH NOSTRIL DAILY    Seasonal allergic rhinitis

## 2018-08-27 NOTE — PROGRESS NOTES
SUBJECTIVE:   CC: Anival Galvez is an 37 year old male who presents for preventative health visit.     Physical   Annual:     Getting at least 3 servings of Calcium per day:  Yes    Bi-annual eye exam:  Yes    Dental care twice a year:  Yes    Sleep apnea or symptoms of sleep apnea:  Daytime drowsiness and Excessive snoring    Diet:  Regular (no restrictions)    Frequency of exercise:  None    Taking medications regularly:  Yes    Medication side effects:  None    Additional concerns today:  YES        PROBLEMS TO ADD ON...    Concern for h/o snoring and feeling tired.   Has frequent heartburns, depending on his diet.       Today's PHQ-2 Score: 0  PHQ-2 ( 1999 Pfizer) 8/27/2018   Q1: Little interest or pleasure in doing things 0   Q2: Feeling down, depressed or hopeless 0   PHQ-2 Score 0   Q1: Little interest or pleasure in doing things Not at all   Q2: Feeling down, depressed or hopeless Not at all   PHQ-2 Score 0       Abuse: Current or Past(Physical, Sexual or Emotional)- No  Do you feel safe in your environment - Yes    Social History   Substance Use Topics     Smoking status: Former Smoker     Smokeless tobacco: Never Used     Alcohol use 0.0 oz/week     0 Standard drinks or equivalent per week      Comment: seldom     Alcohol Use 8/27/2018   If you drink alcohol do you typically have greater than 3 drinks per day OR greater than 7 drinks per week? No       Last PSA: No results found for: PSA    Reviewed orders with patient. Reviewed health maintenance and updated orders accordingly - Yes  Labs reviewed in EPIC  BP Readings from Last 3 Encounters:   08/27/18 124/80   06/22/18 128/80   01/19/18 128/81    Wt Readings from Last 3 Encounters:   08/27/18 191 lb (86.6 kg)   06/22/18 190 lb (86.2 kg)   01/19/18 193 lb (87.5 kg)                    Reviewed and updated as needed this visit by clinical staff  Tobacco  Allergies         Reviewed and updated as needed this visit by Provider            Review of  "Systems  CONSTITUTIONAL: NEGATIVE for fever, chills, change in weight  INTEGUMENTARY/SKIN: NEGATIVE for worrisome rashes, moles or lesions  EYES: NEGATIVE for vision changes or irritation  ENT: NEGATIVE for ear, mouth and throat problems  RESP: NEGATIVE for significant cough or SOB  CV: NEGATIVE for chest pain, palpitations or peripheral edema  GI: NEGATIVE for nausea, abdominal pain, heartburn, or change in bowel habits   male: negative for dysuria, hematuria, decreased urinary stream, erectile dysfunction, urethral discharge  MUSCULOSKELETAL: NEGATIVE for significant arthralgias or myalgia  NEURO: NEGATIVE for weakness, dizziness or paresthesias  PSYCHIATRIC: NEGATIVE for changes in mood or affect    OBJECTIVE:    5' 8\" (1.727 m)    Physical Exam  GENERAL: healthy, alert and no distress  EYES: Eyes grossly normal to inspection, PERRL and conjunctivae and sclerae normal  HENT: ear canals and TM's normal, nose and mouth without ulcers or lesions  NECK: no adenopathy, no asymmetry, masses, or scars and thyroid normal to palpation  RESP: lungs clear to auscultation - no rales, rhonchi or wheezes  CV: regular rate and rhythm, normal S1 S2, no S3 or S4, no murmur, click or rub, no peripheral edema and peripheral pulses strong  ABDOMEN: soft, nontender, no hepatosplenomegaly, no masses and bowel sounds normal  MS: no gross musculoskeletal defects noted, no edema  SKIN: no suspicious lesions or rashes  NEURO: Normal strength and tone, mentation intact and speech normal  PSYCH: mentation appears normal, affect normal/bright    Diagnostic Test Results:  none     ASSESSMENT/PLAN:       ICD-10-CM    1. Routine general medical examination at a health care facility Z00.00 **CBC with platelets FUTURE anytime     **Comprehensive metabolic panel FUTURE anytime     Lipid panel reflex to direct LDL Fasting     **TSH with free T4 reflex FUTURE anytime   2. Snoring R06.83 SLEEP EVALUATION & MANAGEMENT REFERRAL - [facility] " "[facility] [place]  [phone] (Age 18 and up)   3. Fatigue, unspecified type R53.83 SLEEP EVALUATION & MANAGEMENT REFERRAL - ADULT -[facility]  [phone] (Age 18 and up)   4. Gastroesophageal reflux disease, esophagitis presence not specified K21.9 H Pylori antigen stool     Assess lab work   Assess sleep study     COUNSELING:   Reviewed preventive health counseling, as reflected in patient instructions       Regular exercise       Healthy diet/nutrition       Vision screening       Hearing screening    BP Readings from Last 1 Encounters:   06/22/18 128/80     Estimated body mass index is 28.89 kg/(m^2) as calculated from the following:    Height as of 6/22/18: 5' 8\" (1.727 m).    Weight as of 6/22/18: 190 lb (86.2 kg).      Weight management plan: Discussed healthy diet and exercise guidelines and patient will follow up in 12 months in clinic to re-evaluate.     reports that he has quit smoking. He has never used smokeless tobacco.      Counseling Resources:  ATP IV Guidelines  Pooled Cohorts Equation Calculator  FRAX Risk Assessment  ICSI Preventive Guidelines  Dietary Guidelines for Americans, 2010  USDA's MyPlate  ASA Prophylaxis  Lung CA Screening    Migue Machuca MD  [facility]  Answers for HPI/ROS submitted by the patient on 8/27/2018   PHQ-2 Score: 0    "

## 2018-08-27 NOTE — NURSING NOTE
"Vital signs:  Temp: 98  F (36.7  C) Temp src: Oral BP: 124/80 Pulse: 93     SpO2: 96 %     Height: 5' 7.75\" (172.1 cm) Weight: 191 lb (86.6 kg)  Estimated body mass index is 29.26 kg/(m^2) as calculated from the following:    Height as of this encounter: 5' 7.75\" (1.721 m).    Weight as of this encounter: 191 lb (86.6 kg).          "

## 2018-12-19 ENCOUNTER — OFFICE VISIT (OUTPATIENT)
Dept: FAMILY MEDICINE | Facility: CLINIC | Age: 38
End: 2018-12-19
Payer: COMMERCIAL

## 2018-12-19 VITALS
TEMPERATURE: 98.4 F | DIASTOLIC BLOOD PRESSURE: 71 MMHG | WEIGHT: 191 LBS | BODY MASS INDEX: 28.95 KG/M2 | OXYGEN SATURATION: 98 % | HEIGHT: 68 IN | RESPIRATION RATE: 20 BRPM | SYSTOLIC BLOOD PRESSURE: 112 MMHG | HEART RATE: 92 BPM

## 2018-12-19 DIAGNOSIS — J40 BRONCHITIS: Primary | ICD-10-CM

## 2018-12-19 PROCEDURE — 99213 OFFICE O/P EST LOW 20 MIN: CPT | Performed by: FAMILY MEDICINE

## 2018-12-19 RX ORDER — AZITHROMYCIN 250 MG/1
TABLET, FILM COATED ORAL
Qty: 6 TABLET | Refills: 0 | Status: SHIPPED | OUTPATIENT
Start: 2018-12-19 | End: 2019-12-06

## 2018-12-19 ASSESSMENT — MIFFLIN-ST. JEOR: SCORE: 1765.87

## 2018-12-19 NOTE — PROGRESS NOTES
"  SUBJECTIVE:   Anival Galvez is a 38 year old male who presents to clinic today for the following health issues:      Acute Illness   Acute illness concerns: cough  Onset: 2 weeks    Fever: no    Chills/Sweats: YES    Headache (location?): no    Sinus Pressure:YES- post-nasal drainage and facial pain    Conjunctivitis:  no    Ear Pain: no    Rhinorrhea: no    Congestion: YES    Sore Throat: YES     Cough: YES-productive of green sputum, with shortness of breath    Wheeze: YES    Decreased Appetite: no    Nausea: no    Vomiting: no    Diarrhea:  no    Dysuria/Freq.: no    Fatigue/Achiness: no    Sick/Strep Exposure: YES     Therapies Tried and outcome: OTC medicine like mucinex.      Pt was on a cruise [place] recently when he got sick, and he feels like his symptoms are not improving.    Problem list and histories reviewed & adjusted, as indicated.  Additional history: as documented    Patient Active Problem List   Diagnosis     Seasonal allergic rhinitis     Osgood-Schlatter's disease     CARDIOVASCULAR SCREENING; LDL GOAL LESS THAN 160     Goiter     Globus sensation     Past Surgical History:   Procedure Laterality Date     wisdom teeth         Social History     Tobacco Use     Smoking status: Former Smoker     Smokeless tobacco: Never Used   Substance Use Topics     Alcohol use: Yes     Alcohol/week: 0.0 oz     Comment: seldom     Family History   Problem Relation Age of Onset     Family History Negative Mother      Family History Negative Father            Reviewed and updated as needed this visit by clinical staff  Tobacco  Allergies  Meds  Med Hx  Surg Hx  Fam Hx  Soc Hx      Reviewed and updated as needed this visit by Provider         ROS:      OBJECTIVE:     /71 (BP Location: Right arm, Patient Position: Chair, Cuff Size: Adult Large)   Pulse 92   Temp 98.4  F (36.9  C) (Oral)   Resp 20   Ht 1.727 m (5' 8\")   Wt 86.6 kg (191 lb)   SpO2 98%   BMI 29.04 kg/m    Body mass index is 29.04 " kg/m .  Head: Normocephalic, atraumatic.  Eyes: Conjunctiva clear, non icteric. PERRLA.  Ears: External ears nl, TM is nl   Nose: Septum midline, nasal mucosa congested. No discharge.  There is no tenderness over the maxillary sinuses, there is no tenderness over the frontal sinuses  Mouth / Throat: Normal dentition.  No oral lesions. Pharynx no erythematous, tonsils no exudate/hypertrophy.  Neck: Supple, no enlarged LN, trachea midline.  LUNGS:  CTA B/L, no wheezing or crackles.  CVS : RRR, no murmur, no rub.            ASSESSMENT/PLAN:             1. Bronchitis  Not improving in 7 days, Advised about rest, OTC medications for symptoms, drink warm liquids, and may use humidifier at night time to improve the cough.  Start on   - azithromycin (ZITHROMAX) 250 MG tablet; Take 2 pills today, then 1 pill for 4 days.  Dispense: 6 tablet; Refill: 0    Follow up in 5 days if symptoms persist, sooner if symptoms worsen or new ones develops, pt may contact us over the phone for any questions or concerns.      Alexsander Funes MD  [facility]

## 2019-10-11 ENCOUNTER — OFFICE VISIT (OUTPATIENT)
Dept: FAMILY MEDICINE | Facility: CLINIC | Age: 39
End: 2019-10-11
Payer: COMMERCIAL

## 2019-10-11 VITALS
DIASTOLIC BLOOD PRESSURE: 75 MMHG | BODY MASS INDEX: 29.86 KG/M2 | SYSTOLIC BLOOD PRESSURE: 121 MMHG | WEIGHT: 197 LBS | HEART RATE: 87 BPM | HEIGHT: 68 IN | TEMPERATURE: 97.9 F | RESPIRATION RATE: 16 BRPM | OXYGEN SATURATION: 97 %

## 2019-10-11 DIAGNOSIS — Z23 NEED FOR PROPHYLACTIC VACCINATION AND INOCULATION AGAINST INFLUENZA: ICD-10-CM

## 2019-10-11 DIAGNOSIS — Z23 NEED FOR HEPATITIS A VACCINATION: ICD-10-CM

## 2019-10-11 DIAGNOSIS — Z71.84 ENCOUNTER FOR COUNSELING FOR TRAVEL: Primary | ICD-10-CM

## 2019-10-11 PROCEDURE — 90471 IMMUNIZATION ADMIN: CPT | Performed by: PHYSICIAN ASSISTANT

## 2019-10-11 PROCEDURE — 90686 IIV4 VACC NO PRSV 0.5 ML IM: CPT | Performed by: PHYSICIAN ASSISTANT

## 2019-10-11 PROCEDURE — 90472 IMMUNIZATION ADMIN EACH ADD: CPT | Performed by: PHYSICIAN ASSISTANT

## 2019-10-11 PROCEDURE — 90632 HEPA VACCINE ADULT IM: CPT | Performed by: PHYSICIAN ASSISTANT

## 2019-10-11 PROCEDURE — 99401 PREV MED CNSL INDIV APPRX 15: CPT | Mod: 25 | Performed by: PHYSICIAN ASSISTANT

## 2019-10-11 RX ORDER — AZITHROMYCIN 500 MG/1
TABLET, FILM COATED ORAL
Qty: 6 TABLET | Refills: 0 | Status: SHIPPED | OUTPATIENT
Start: 2019-10-11 | End: 2019-12-06

## 2019-10-11 RX ORDER — ATOVAQUONE AND PROGUANIL HYDROCHLORIDE 250; 100 MG/1; MG/1
1 TABLET, FILM COATED ORAL DAILY
Qty: 19 TABLET | Refills: 0 | Status: SHIPPED | OUTPATIENT
Start: 2019-10-11 | End: 2019-12-06

## 2019-10-11 ASSESSMENT — MIFFLIN-ST. JEOR: SCORE: 1788.09

## 2019-10-11 NOTE — PROGRESS NOTES
SUBJECTIVE: Anival Galvez , a 39 year old  male, presents for counseling and information regarding upcoming travel to [facility]. Special medical concerns include: none. He anticipates the following unusual exposures: none.    Itinerary:  [facility]     Departure Date: 11/14/19 Return date: 12/3/19    Reason for travel (i.e. Business, pleasure): pleasure    Visiting an urban or rural area?:  Both - [place], [place], [facility], [facility]    Accommodations (i.e. hotel, hostel, friends, family, etc):  Hotel and family     Women - First day of your last period:  NA     IMMUNIZATION HISTORY  Have you received any vaccinations in the past 4 weeks?  No  Have you ever fainted from having your blood drawn or from an injection?  No  Have you ever had a fever reaction to vaccination?  No  Have you ever had any bad reaction or side effect from any vaccination?  No  Have you ever had hepatitis A or B vaccine?  Yes  Do you live (or work closely) with anyone who has AIDS, an AIDS-like condition, any other immune disorder or who is on chemotherapy for cancer?  No  Have you received any injection of immune globulin or any blood products during the past 12 months?  No    GENERAL MEDICAL HISTORY  Do you have a medical condition that warrants maintenance medication or physician follow-up?  No  Do you have a medical condition that is stable now, but that may recur while traveling?  No  Has your spleen been removed?  No  Have you had an acute illness or a fever in the past 48 hours?  No  Are you pregnant, or might you become pregnant on this trip?  Any chance of pregnancy?  No  Are you breastfeeding?  No  Do you have HIV, AIDS, an AIDS-like condition, any other immune disorder, leukemia or cancer?  No  Do you have a severe combined immunodeficiency disease?  No  Have you had your thymus gland removed or history of problems with your thymus, such as myasthenia gravis, DiGeorge syndrome, or thymoma?  No    Do you have severe thrombocytopenia  (low platelet count) or a coagulation disorder?  No  Have you ever had a convulsion, seizure, epilepsy, neurologic condition or brain infection?  No  Do you have any stomach conditions?  No  Do you have a G6PD deficiency?  No  Do you have severe renal or kidney impairment?  No  Do you have a history of psychiatric problems?  No  Do you have a problem with strange dreams and/or nightmares?  No  Do you have insomnia?  No  Do you have problems with vaginitis?  No  Do you have psoriasis?  No  Are you prone to motion sickness?  No  Have you ever had headaches, nausea, vomiting, or breathing problems from altitude exposure?  No      Past Medical History:   Diagnosis Date     CARDIOVASCULAR SCREENING; LDL GOAL LESS THAN 160 5/12/2017     Globus sensation 5/12/2017     Goiter 5/12/2017     Osgood-Schlatter's disease 3/24/2016     Seasonal allergic rhinitis 3/24/2016      Immunization History   Administered Date(s) Administered     HepB-Adult 06/16/1997     Historical DTP/aP 03/30/1987     OPV, trivalent, live 03/30/1987     TDAP Vaccine (Adacel) 03/24/2016     Td (Adult), Adsorbed 04/24/1997       Current Outpatient Medications   Medication Sig Dispense Refill     albuterol (PROAIR HFA/PROVENTIL HFA/VENTOLIN HFA) 108 (90 BASE) MCG/ACT Inhaler Inhale 2 puffs into the lungs every 6 hours as needed for shortness of breath / dyspnea or wheezing 1 Inhaler 0     azithromycin (ZITHROMAX) 250 MG tablet Take 2 pills today, then 1 pill for 4 days. 6 tablet 0     fluticasone (FLONASE) 50 MCG/ACT spray USE ONE OR TWO SPRAYS IN EACH NOSTRIL DAILY 16 mL 8     No Known Allergies     EXAM: deferred    Immunizations discussed include: Hepatitis A, Typhoid and influenza  Malaria prophylaxis recommended: Malarone  Symptomatic treatment for traveler's diarrhea: bismuth subsalicylate, loperamide/diphenoxylate and azithromycin    ASSESSMENT/PLAN:    (Z71.84) Encounter for counseling for travel  (primary encounter diagnosis)    Comment: Hepatitis  A, oral typhoid, and influenza vaccines today. Patient will return or follow-up with PCP in 6 months for Hep A booster. Prophylaxis given for Traveler's diarrhea and Malaria. All questions were answered.     Plan: typhoid (VIVOTIF) CR capsule,         atovaquone-proguanil (MALARONE) 250-100 MG         tablet, azithromycin (ZITHROMAX) 500 MG tablet            (Z23) Need for hepatitis A vaccination  Comment:   Plan: HEPA VACCINE ADULT IM              I have reviewed general recommendations for safe travel   including: food/water precautions, insect avoidance, safe sex   practices given high prevalence of HIV and other STDs,   roadway safety. Educational materials and links to the CDC   Traveler's health website have been provided.    Total time 15 minutes, greater than 50 percent in counseling   and coordination of care.

## 2019-10-11 NOTE — PATIENT INSTRUCTIONS
"See travel packet provided  Recommend ultrathon (mosquito repellant), pepto bismol and imodium  The food and drink choices you make while traveling can impact your likelihood of getting sick.   If you aren't sure if a food or drink is safe, the saying \" BOIL IT, COOK IT, PEEL IT, OR FORGET IT\" can help you decide whether it's okay to consume.   Also bring hand  and sun screen with you.  Safe Travels       Today October 11, 2019 you received the    Flu Vaccine    Hepatitis A Vaccine - Please return on 4/11/20 or later for your 2nd and final dose.  .    These appointments can be made as a NURSE ONLY visit.    **It is very important for the vaccinations to be given on the scheduled day(s), this helps ensure you receive the full effectiveness of the vaccine.**    Please call [facility] with any questions [phone]    Thank you for visiting [facility]    "

## 2019-12-06 ENCOUNTER — OFFICE VISIT (OUTPATIENT)
Dept: FAMILY MEDICINE | Facility: CLINIC | Age: 39
End: 2019-12-06
Payer: COMMERCIAL

## 2019-12-06 VITALS
BODY MASS INDEX: 29.55 KG/M2 | DIASTOLIC BLOOD PRESSURE: 87 MMHG | RESPIRATION RATE: 16 BRPM | SYSTOLIC BLOOD PRESSURE: 133 MMHG | HEART RATE: 89 BPM | TEMPERATURE: 99 F | OXYGEN SATURATION: 99 % | WEIGHT: 195 LBS | HEIGHT: 68 IN

## 2019-12-06 DIAGNOSIS — J30.2 SEASONAL ALLERGIC RHINITIS, UNSPECIFIED TRIGGER: ICD-10-CM

## 2019-12-06 DIAGNOSIS — E78.1 HYPERTRIGLYCERIDEMIA: ICD-10-CM

## 2019-12-06 DIAGNOSIS — K30 INDIGESTION: ICD-10-CM

## 2019-12-06 DIAGNOSIS — Z00.00 ROUTINE GENERAL MEDICAL EXAMINATION AT A HEALTH CARE FACILITY: Primary | ICD-10-CM

## 2019-12-06 PROBLEM — E04.9 GOITER: Status: RESOLVED | Noted: 2017-05-12 | Resolved: 2019-12-06

## 2019-12-06 PROBLEM — Z13.6 CARDIOVASCULAR SCREENING; LDL GOAL LESS THAN 160: Status: RESOLVED | Noted: 2017-05-12 | Resolved: 2019-12-06

## 2019-12-06 LAB
CHOLEST SERPL-MCNC: 205 MG/DL
GLUCOSE SERPL-MCNC: 95 MG/DL (ref 70–99)
HBA1C MFR BLD: 5.9 % (ref 0–5.6)
HDLC SERPL-MCNC: 42 MG/DL
LDLC SERPL CALC-MCNC: 122 MG/DL
NONHDLC SERPL-MCNC: 163 MG/DL
TRIGL SERPL-MCNC: 207 MG/DL

## 2019-12-06 PROCEDURE — 80061 LIPID PANEL: CPT | Performed by: FAMILY MEDICINE

## 2019-12-06 PROCEDURE — 99395 PREV VISIT EST AGE 18-39: CPT | Performed by: FAMILY MEDICINE

## 2019-12-06 PROCEDURE — 83036 HEMOGLOBIN GLYCOSYLATED A1C: CPT | Performed by: FAMILY MEDICINE

## 2019-12-06 PROCEDURE — 82947 ASSAY GLUCOSE BLOOD QUANT: CPT | Performed by: FAMILY MEDICINE

## 2019-12-06 PROCEDURE — 36415 COLL VENOUS BLD VENIPUNCTURE: CPT | Performed by: FAMILY MEDICINE

## 2019-12-06 RX ORDER — FAMOTIDINE 20 MG/1
20 TABLET, FILM COATED ORAL 2 TIMES DAILY
Qty: 60 TABLET | Refills: 3 | Status: SHIPPED | OUTPATIENT
Start: 2019-12-06 | End: 2021-09-17

## 2019-12-06 RX ORDER — FLUTICASONE PROPIONATE 50 MCG
2 SPRAY, SUSPENSION (ML) NASAL DAILY
Qty: 48 ML | Refills: 3 | Status: SHIPPED | OUTPATIENT
Start: 2019-12-06 | End: 2021-03-17

## 2019-12-06 ASSESSMENT — ENCOUNTER SYMPTOMS
EYE PAIN: 0
MYALGIAS: 0
DIZZINESS: 0
CHILLS: 0
JOINT SWELLING: 0
NAUSEA: 0
CONSTIPATION: 0
PALPITATIONS: 0
HEMATURIA: 0
COUGH: 0
ARTHRALGIAS: 0
HEADACHES: 0
SORE THROAT: 0
FREQUENCY: 0
PARESTHESIAS: 0
HEARTBURN: 0
ABDOMINAL PAIN: 0
SHORTNESS OF BREATH: 1
WEAKNESS: 0
HEMATOCHEZIA: 0
DIARRHEA: 0
NERVOUS/ANXIOUS: 0
FEVER: 0

## 2019-12-06 ASSESSMENT — MIFFLIN-ST. JEOR: SCORE: 1779.01

## 2019-12-06 NOTE — PROGRESS NOTES
SUBJECTIVE:   CC: Anival Galvez is an 39 year old male who presents for preventative health visit.     Healthy Habits:     Getting at least 3 servings of Calcium per day:  NO    Bi-annual eye exam:  Yes    Dental care twice a year:  NO    Sleep apnea or symptoms of sleep apnea:  Excessive snoring    Diet:  Regular (no restrictions)    Frequency of exercise:  None    Taking medications regularly:  Yes    Medication side effects:  None    PHQ-2 Total Score: 0    Additional concerns today:  No            Today's PHQ-2 Score:   PHQ-2 ( 1999 Pfizer) 12/6/2019   Q1: Little interest or pleasure in doing things 0   Q2: Feeling down, depressed or hopeless 0   PHQ-2 Score 0   Q1: Little interest or pleasure in doing things Not at all   Q2: Feeling down, depressed or hopeless Not at all   PHQ-2 Score 0       Abuse: Current or Past(Physical, Sexual or Emotional)- No  Do you feel safe in your environment? Yes        Social History     Tobacco Use     Smoking status: Former Smoker     Packs/day: 1.00     Years: 5.00     Pack years: 5.00     Types: Cigarettes     Smokeless tobacco: Never Used   Substance Use Topics     Alcohol use: Yes     Alcohol/week: 0.0 standard drinks     Comment: seldom     If you drink alcohol do you typically have >3 drinks per day or >7 drinks per week? No    Alcohol Use 12/6/2019   Prescreen: >3 drinks/day or >7 drinks/week? No   Prescreen: >3 drinks/day or >7 drinks/week? -   No flowsheet data found.    Last PSA: No results found for: PSA    Reviewed orders with patient. Reviewed health maintenance and updated orders accordingly - Yes  Lab work is in process  Labs reviewed in EPIC  BP Readings from Last 3 Encounters:   12/06/19 133/87   10/11/19 121/75   12/19/18 112/71    Wt Readings from Last 3 Encounters:   12/06/19 88.5 kg (195 lb)   10/11/19 89.4 kg (197 lb)   12/19/18 86.6 kg (191 lb)                  Patient Active Problem List   Diagnosis     Seasonal allergic rhinitis     Osgood-Schlatter's  disease     Globus sensation     Past Surgical History:   Procedure Laterality Date     wisdom teeth         Social History     Tobacco Use     Smoking status: Former Smoker     Packs/day: 1.00     Years: 5.00     Pack years: 5.00     Types: Cigarettes     Smokeless tobacco: Never Used   Substance Use Topics     Alcohol use: Yes     Alcohol/week: 0.0 standard drinks     Comment: seldom     Family History   Problem Relation Age of Onset     Family History Negative Mother      Family History Negative Father          Current Outpatient Medications   Medication Sig Dispense Refill     albuterol (PROAIR HFA/PROVENTIL HFA/VENTOLIN HFA) 108 (90 BASE) MCG/ACT Inhaler Inhale 2 puffs into the lungs every 6 hours as needed for shortness of breath / dyspnea or wheezing 1 Inhaler 0     No Known Allergies    Reviewed and updated as needed this visit by clinical staff  Tobacco  Allergies  Meds  Med Hx  Surg Hx  Fam Hx  Soc Hx        Reviewed and updated as needed this visit by Provider        Past Medical History:   Diagnosis Date     CARDIOVASCULAR SCREENING; LDL GOAL LESS THAN 160 5/12/2017     Globus sensation 5/12/2017     Goiter 5/12/2017     Osgood-Schlatter's disease 3/24/2016     Seasonal allergic rhinitis 3/24/2016      Past Surgical History:   Procedure Laterality Date     wisdom teeth         Review of Systems   Constitutional: Negative for chills and fever.   HENT: Negative for congestion, ear pain, hearing loss and sore throat.    Eyes: Positive for visual disturbance. Negative for pain.   Respiratory: Positive for shortness of breath. Negative for cough.    Cardiovascular: Negative for chest pain, palpitations and peripheral edema.   Gastrointestinal: Negative for abdominal pain, constipation, diarrhea, heartburn, hematochezia and nausea.   Genitourinary: Negative for discharge, frequency, genital sores, hematuria, impotence and urgency.   Musculoskeletal: Negative for arthralgias, joint swelling and myalgias.    Skin: Negative for rash.   Neurological: Negative for dizziness, weakness, headaches and paresthesias.   Psychiatric/Behavioral: Negative for mood changes. The patient is not nervous/anxious.      Pt admits to have indigestion symptoms sometimes, after certain food.  Also pt is snoring at night according to wife, pt denies any fatigue or sleepy during the day, no day time nap, no dosing off during the day.    OBJECTIVE:   There were no vitals taken for this visit.    Physical Exam  GENERAL: healthy, alert and no distress  EYES: Eyes grossly normal to inspection, PERRL and conjunctivae and sclerae normal  HENT: normal cephalic/atraumatic, ear canals and TM's normal, nasal mucosa edematous , oropharynx clear and oral mucous membranes moist  NECK: no adenopathy, no asymmetry, masses, or scars and thyroid normal to palpation  RESP: lungs clear to auscultation - no rales, rhonchi or wheezes  CV: regular rate and rhythm, normal S1 S2, no S3 or S4, no murmur, click or rub, no peripheral edema and peripheral pulses strong  ABDOMEN: soft, nontender, no hepatosplenomegaly, no masses and bowel sounds normal  MS: no gross musculoskeletal defects noted, no edema  SKIN: no suspicious lesions or rashes  NEURO: Normal strength and tone, mentation intact and speech normal  PSYCH: mentation appears normal, affect normal/bright        ASSESSMENT/PLAN:   1. Routine general medical examination at a health care facility  Today I counseled the patient about diet, regular exercise and weight loss planning.    - Glucose  - Hemoglobin A1c  - Lipid panel reflex to direct LDL Fasting    2. Seasonal allergic rhinitis, unspecified trigger  Continue on   - fluticasone (FLONASE) 50 MCG/ACT nasal spray; Spray 2 sprays into both nostrils daily  Dispense: 48 mL; Refill: 3    3. Hypertriglyceridemia  As above, discussed diet and exercise.    4. Indigestion  May use  - famotidine (PEPCID) 20 MG tablet; Take 1 tablet (20 mg) by mouth 2 times daily as  "needed Dispense: 60 tablet; Refill: 3    COUNSELING:   Reviewed preventive health counseling, as reflected in patient instructions       Regular exercise       Healthy diet/nutrition    Estimated body mass index is 29.95 kg/m  as calculated from the following:    Height as of 10/11/19: 1.727 m (5' 8\").    Weight as of 10/11/19: 89.4 kg (197 lb).     Weight management plan: Discussed healthy diet and exercise guidelines     reports that he has quit smoking. His smoking use included cigarettes. He has a 5.00 pack-year smoking history. He has never used smokeless tobacco.      Counseling Resources:  ATP IV Guidelines  Pooled Cohorts Equation Calculator  FRAX Risk Assessment  ICSI Preventive Guidelines  Dietary Guidelines for Americans, 2010  USDA's MyPlate  ASA Prophylaxis  Lung CA Screening    Alexsander Funes MD  [facility]"

## 2019-12-06 NOTE — PATIENT INSTRUCTIONS
If you are having hiccups or indigestion, I recommend Ranitidine 150 mg, or Famotidine (pepcid) 20 mg once.    Preventive Health Recommendations  Male Ages 26 - 39    Yearly exam:             See your health care provider every year in order to  o   Review health changes.   o   Discuss preventive care.    o   Review your medicines if your doctor has prescribed any.    You should be tested each year for STDs (sexually transmitted diseases), if you re at risk.     After age 35, talk to your provider about cholesterol testing. If you are at risk for heart disease, have your cholesterol tested at least every 5 years.     If you are at risk for diabetes, you should have a diabetes test (fasting glucose).  Shots: Get a flu shot each year. Get a tetanus shot every 10 years.     Nutrition:    Eat at least 5 servings of fruits and vegetables daily.     Eat whole-grain bread, whole-wheat pasta and brown rice instead of white grains and rice.     Get adequate Calcium and Vitamin D.     Lifestyle    Exercise for at least 150 minutes a week (30 minutes a day, 5 days a week). This will help you control your weight and prevent disease.     Limit alcohol to one drink per day.     No smoking.     Wear sunscreen to prevent skin cancer.     See your dentist every six months for an exam and cleaning.

## 2020-02-10 NOTE — PROGRESS NOTES
SUBJECTIVE:     CC: Anival Galvez is an 36 year old male who presents for preventative health visit.     Healthy Habits:    Do you get at least three servings of calcium containing foods daily (dairy, green leafy vegetables, etc.)? No    Amount of exercise or daily activities, outside of work: rarely    Problems taking medications regularly No    Medication side effects: No    Have you had an eye exam in the past two years? yes    Do you see a dentist twice per year? yes    Do you have sleep apnea, excessive snoring or daytime drowsiness?yes- wife states he snores    Today's PHQ-2 Score:   PHQ-2 ( 1999 Pfizer) 5/12/2017 9/7/2016   Q1: Little interest or pleasure in doing things 0 0   Q2: Feeling down, depressed or hopeless 0 0   PHQ-2 Score 0 0     Abuse: Current or Past(Physical, Sexual or Emotional)- No  Do you feel safe in your environment - Yes    Social History   Substance Use Topics     Smoking status: Former Smoker     Smokeless tobacco: Never Used     Alcohol use 0.0 oz/week     0 Standard drinks or equivalent per week      Comment: seldom     The patient does not drink >3 drinks per day nor >7 drinks per week.    Last PSA: No results found for: PSA    No results for input(s): CHOL, HDL, LDL, TRIG, CHOLHDLRATIO, NHDL in the last 05541 hours.    Reviewed orders with patient. Reviewed health maintenance and updated orders accordingly - Yes    Reviewed and updated as needed this visit by clinical staff  Tobacco  Allergies  Med Hx  Soc Hx      Reviewed and updated as needed this visit by Provider      Routine general medical examination at a health care facility  (primary encounter diagnosis):              Past Medical History:   Diagnosis Date     CARDIOVASCULAR SCREENING; LDL GOAL LESS THAN 160 5/12/2017     Globus sensation 5/12/2017     Goiter 5/12/2017     Osgood-Schlatter's disease 3/24/2016     Seasonal allergic rhinitis 3/24/2016       Past Surgical History:   Procedure Laterality Date     troy  Per HERAMN workerDeb's request - paperwork faxed to [facility] for review   "teeth         Family History   Problem Relation Age of Onset     Family History Negative Mother      Family History Negative Father        Social History   Substance Use Topics     Smoking status: Former Smoker     Smokeless tobacco: Never Used     Alcohol use 0.0 oz/week     0 Standard drinks or equivalent per week      Comment: seldom       ROS: Denies vision changes, chest pain, SOB, coughing, palpations, diarrhea, constipation, dysuria, urinary frequency, bruising, numbness, tingles. Stable knee pain. The patient notes that he experienced chest tightness and SOB once and went to urgent care. He had a chest x-ray which was negative. He believes this was due to stress. A complete review of systems is otherwise negative  See additional note    This document serves as a record of the services and decisions personally performed and made by Elsa Daigle MD. It was created on his behalf by Contreras Leavitt a trained medical scribe. The creation of this document is based the provider's statements to the medical scribe. Contreras Leavitt May 12, 2017 3:48 PM  OBJECTIVE:     /74 (BP Location: Right arm, Patient Position: Chair, Cuff Size: Adult Regular)  Pulse 72  Temp 98.9  F (37.2  C) (Oral)  Resp 16  Ht 5' 8\" (1.727 m)  Wt 192 lb (87.1 kg)  BMI 29.19 kg/m2  EXAM:  GEN: Healthy, Alert, No distress  EYES: pupils are symmetric  ENT: ears without cerumen, mucus membranes moist  NECK: see additional note  CHEST: Clear to auscultation, respirations unlabored  HEART: S1S2 RRR no murmur nor apical displacement  ABD: soft without mass or organomegaly   MS:prominent tibial tubercles R>L  ASSESSMENT/PLAN:     (Z00.00) Routine general medical examination at a health care facility  (primary encounter diagnosis)  Comment: [place] database  Plan: LIPID REFLEX TO DIRECT LDL PANEL, Basic         metabolic panel  (Ca, Cl, CO2, Creat, Gluc, K,         Na, BUN), TSH with free T4 reflex                COUNSELING:  Reviewed " "preventive health counseling, as reflected in patient instructions       Regular exercise       Healthy diet/nutrition     reports that he has quit smoking. He has never used smokeless tobacco.  Estimated body mass index is 29.19 kg/(m^2) as calculated from the following:    Height as of this encounter: 5' 8\" (1.727 m).    Weight as of this encounter: 192 lb (87.1 kg).   Weight management plan: Discussed. patient will exercise more fiber    Counseling Resources:  ATP IV Guidelines  Pooled Cohorts Equation Calculator  FRAX Risk Assessment  ICSI Preventive Guidelines  Dietary Guidelines for Americans, 2010  USDA's MyPlate  ASA Prophylaxis  Lung CA Screening    The information in this document, created by a scribe for me, accurately reflects the services I personally performed and the decisions made by me. I have reviewed and approved this document for accuracy. 3:48 PM May 12, 2017    Pilo Hunter MD  [facility]  "

## 2020-02-21 ENCOUNTER — OFFICE VISIT (OUTPATIENT)
Dept: INTERNAL MEDICINE | Facility: CLINIC | Age: 40
End: 2020-02-21
Payer: COMMERCIAL

## 2020-02-21 VITALS
HEART RATE: 98 BPM | WEIGHT: 199.3 LBS | HEIGHT: 68 IN | DIASTOLIC BLOOD PRESSURE: 82 MMHG | RESPIRATION RATE: 23 BRPM | TEMPERATURE: 98.3 F | BODY MASS INDEX: 30.2 KG/M2 | OXYGEN SATURATION: 97 % | SYSTOLIC BLOOD PRESSURE: 110 MMHG

## 2020-02-21 DIAGNOSIS — J40 BRONCHITIS: ICD-10-CM

## 2020-02-21 DIAGNOSIS — Z20.828 EXPOSURE TO THE FLU: Primary | ICD-10-CM

## 2020-02-21 LAB
FLUAV+FLUBV AG SPEC QL: NEGATIVE
FLUAV+FLUBV AG SPEC QL: NEGATIVE
SPECIMEN SOURCE: NORMAL

## 2020-02-21 PROCEDURE — 87804 INFLUENZA ASSAY W/OPTIC: CPT | Performed by: INTERNAL MEDICINE

## 2020-02-21 PROCEDURE — 99214 OFFICE O/P EST MOD 30 MIN: CPT | Performed by: INTERNAL MEDICINE

## 2020-02-21 RX ORDER — AZITHROMYCIN 250 MG/1
TABLET, FILM COATED ORAL
Qty: 6 TABLET | Refills: 0 | Status: SHIPPED | OUTPATIENT
Start: 2020-02-21 | End: 2020-09-10

## 2020-02-21 ASSESSMENT — MIFFLIN-ST. JEOR: SCORE: 1798.52

## 2020-02-21 NOTE — PROGRESS NOTES
Subjective     Anival Galvez is a 39 year old male who presents to clinic today for the following health issues:    HPI     Pt  is a 39 year old male who is seen here to day cough since 3 weeks, associated with thick greenish sputum.  Patient had chills initially but that resolved, no fever, no sore throat, no ear pain or pressure.  No history of asthma, no shortness of breath or wheezing.  Patient has been taking over-the-counter NyQuil, Tylenol, Advil as needed.  Patient states his daughter was diagnosed with influenza about 2 weeks ago and she took Tamiflu.  Wife also has been having cough.  Patient has taken flu vaccine this season.      Patient Active Problem List   Diagnosis     Seasonal allergic rhinitis     Osgood-Schlatter's disease     Globus sensation     Past Surgical History:   Procedure Laterality Date     wisdom teeth         Social History     Tobacco Use     Smoking status: Former Smoker     Packs/day: 1.00     Years: 5.00     Pack years: 5.00     Types: Cigarettes     Smokeless tobacco: Never Used   Substance Use Topics     Alcohol use: Yes     Alcohol/week: 0.0 standard drinks     Comment: seldom     Family History   Problem Relation Age of Onset     Family History Negative Mother      Family History Negative Father          Current Outpatient Medications   Medication Sig Dispense Refill     famotidine (PEPCID) 20 MG tablet Take 1 tablet (20 mg) by mouth 2 times daily 60 tablet 3     fluticasone (FLONASE) 50 MCG/ACT nasal spray Spray 2 sprays into both nostrils daily 48 mL 3       Reviewed and updated as needed this visit by Provider         Review of Systems   ROS COMP: CONSTITUTIONAL: NEGATIVE for fever, chills, change in weight  EYES: NEGATIVE for vision changes or irritation  ENT/MOUTH: NEGATIVE for ear, mouth and throat problems  RESP:cough-productive  CV: NEGATIVE for chest pain, palpitations or peripheral edema  MUSCULOSKELETAL: NEGATIVE for significant arthralgias or myalgia     "  Objective    /82   Pulse 98   Temp 98.3  F (36.8  C) (Oral)   Resp 23   Ht 1.727 m (5' 8\")   Wt 90.4 kg (199 lb 4.8 oz)   SpO2 97%   BMI 30.30 kg/m    Body mass index is 30.3 kg/m .  Physical Exam   GENERAL: healthy, alert and no distress  EYES: Eyes grossly normal to inspection, PERRL and conjunctivae and sclerae normal  HENT: ear canals and TM's normal, nose and mouth without ulcers or lesions  NECK: no adenopathy, no asymmetry, masses, or scars and thyroid normal to palpation  RESP: lungs clear to auscultation - no rales, rhonchi or wheezes  CV: regular rate and rhythm,   MS:  no edema, no calf tenderness            Assessment & Plan          (J40) Bronchitis  Plan: Started on azithromycin 250 MG PO tablet as directed.explained clearly about the medication,insructions and side effects.  Patient was advised to take over-the-counter Robitussin cough syrup or Mucinex as needed.  Call or return to clinic prn if these symtoms worsen, fail to improve as anticipated, or if new symptoms develop.              (Z20.828) Exposure to the flu    Plan: Exposure to flu 2 weeks ago and the patient has not had any fevers, will check influenza A/B antigen. pt was told I will contact him after results and proceed accordingly.       Alyse Callahan MD  [facility]        "

## 2020-09-10 ENCOUNTER — OFFICE VISIT (OUTPATIENT)
Dept: FAMILY MEDICINE | Facility: CLINIC | Age: 40
End: 2020-09-10
Payer: COMMERCIAL

## 2020-09-10 VITALS
WEIGHT: 199.1 LBS | TEMPERATURE: 98.1 F | DIASTOLIC BLOOD PRESSURE: 79 MMHG | OXYGEN SATURATION: 97 % | BODY MASS INDEX: 30.27 KG/M2 | SYSTOLIC BLOOD PRESSURE: 118 MMHG | HEART RATE: 97 BPM

## 2020-09-10 DIAGNOSIS — Z01.818 PRE-OP EXAM: Primary | ICD-10-CM

## 2020-09-10 DIAGNOSIS — H26.9 CATARACT OF LEFT EYE, UNSPECIFIED CATARACT TYPE: ICD-10-CM

## 2020-09-10 PROCEDURE — 99214 OFFICE O/P EST MOD 30 MIN: CPT | Performed by: FAMILY MEDICINE

## 2020-09-10 NOTE — PROGRESS NOTES
[address] [address]  Phone: [phone]  Primary Provider: Alexsander Funes  Pre-op Performing Provider: GELACIO PERALTA    PREOPERATIVE EVALUATION:  Today's date: 9/10/2020    Anival Galvez is a 39 year old male who presents for a preoperative evaluation.    Surgical Information:  Surgery Details 9/10/2020   Surgery/Procedure: Cataract Surgury (Left eye)   Surgery Location: [facility]   Surgeon: Dr. Tho Celis   Surgery Date: 9/22/2020   Time of Surgery: Morning TBD   Where patient plans to recover: At home with family   Additional recovery plan details: N/A     Fax number for surgical facility: [phone]  Type of Anesthesia Anticipated: Choice    Subjective     HPI related to upcoming procedure:   Left cataract surgery,      Preop Questions 9/10/2020   1. Have you ever had a heart attack or stroke? No   2. Have you ever had surgery on your heart or blood vessels, such as a stent placement, a coronary artery bypass, or surgery on an artery in your head, neck, heart, or legs? No   3. Do you have chest pain with activity? No   4. Do you have a history of  heart failure? No   5. Do you currently have a cold, bronchitis or symptoms of other infection? No   6. Do you have a cough, shortness of breath, or wheezing? No   7. Do you or anyone in your family have previous history of blood clots? No   8. Do you or does anyone in your family have a serious bleeding problem such as prolonged bleeding following surgeries or cuts? No   9. Have you ever had problems with anemia or been told to take iron pills? No   10. Have you had any abnormal blood loss such as black, tarry or bloody stools? No   11. Have you ever had a blood transfusion? No   Are you willing to have a blood transfusion if it is medically needed before, during, or after your surgery? Yes   13. Have you or any of your relatives ever had problems with anesthesia? No   14. Do you  have sleep apnea, excessive snoring or daytime drowsiness? UNKNOWN - Wife reports snoring   15. Do you have any artifical heart valves or other implanted medical devices like a pacemaker, defibrillator, or continuous glucose monitor? No   16. Do you have artificial joints? No   17. Are you allergic to latex? No     Patient does not have a Health Care Directive or Living Will: Discussed advance care planning with patient; however, patient declined at this time.    [place] ([place] reviewed:  reviewed- no concerns    See problem list for active medical problems.  Problems all longstanding and stable, except as noted/documented.  See ROS for pertinent symptoms related to these conditions.      Review of Systems  CONSTITUTIONAL: NEGATIVE for fever, chills, change in weight  ENT/MOUTH: NEGATIVE for ear, mouth and throat problems  RESP: NEGATIVE for significant cough or SOB  CV: NEGATIVE for chest pain, palpitations or peripheral edema      Patient Active Problem List    Diagnosis Date Noted     Cataract of left eye, unspecified cataract type 09/10/2020     Priority: Medium     Globus sensation 05/12/2017     Priority: Medium     Seasonal allergic rhinitis 03/24/2016     Priority: Medium     Osgood-Schlatter's disease 03/24/2016     Priority: Medium      Past Medical History:   Diagnosis Date     CARDIOVASCULAR SCREENING; LDL GOAL LESS THAN 160 5/12/2017     Globus sensation 5/12/2017     Goiter 5/12/2017     Osgood-Schlatter's disease 3/24/2016     Seasonal allergic rhinitis 3/24/2016     Past Surgical History:   Procedure Laterality Date     wisdom teeth       Current Outpatient Medications   Medication Sig Dispense Refill     famotidine (PEPCID) 20 MG tablet Take 1 tablet (20 mg) by mouth 2 times daily 60 tablet 3     fluticasone (FLONASE) 50 MCG/ACT nasal spray Spray 2 sprays into both nostrils daily 48 mL 3       No Known Allergies     Social History     Tobacco Use     Smoking status: Former Smoker      Packs/day: 1.00     Years: 5.00     Pack years: 5.00     Types: Cigarettes     Smokeless tobacco: Never Used   Substance Use Topics     Alcohol use: Yes     Alcohol/week: 0.0 standard drinks     Comment: seldom     Negative FH    History   Drug Use No            Objective   /79 (BP Location: Right arm, Patient Position: Chair, Cuff Size: Adult Large)   Pulse 97   Temp 98.1  F (36.7  C) (Oral)   Wt 90.3 kg (199 lb 1.6 oz)   SpO2 97%   BMI 30.27 kg/m    Physical Exam  GENERAL APPEARANCE: healthy, alert and no distress  HENT: ear canals and TM's normal and nose and mouth without ulcers or lesions  RESP: lungs clear to auscultation - no rales, rhonchi or wheezes  CV: regular rate and rhythm, normal S1 S2, no S3 or S4 and no murmur, click or rub   ABDOMEN: soft, nontender, no HSM or masses and bowel sounds normal  NEURO: Normal strength and tone, sensory exam grossly normal, mentation intact and speech normal    Recent Labs   Lab Test 12/06/19  0920   A1C 5.9*        PRE-OP Diagnostics:  No labs were ordered during this visit.  No EKG required for low risk surgery (cataract, skin procedure, breast biopsy, etc).         Assessment & Plan   The proposed surgical procedure is considered LOW risk.    REVISED CARDIAC RISK INDEX  The patient has the following serious cardiovascular risks for perioperative complications:  No serious cardiac risks = 0 points    INTERPRETATION: 0 points: Class I (very low risk - 0.4% complication rate)         ICD-10-CM    1. Pre-op exam  Z01.818    2. Cataract of left eye, unspecified cataract type  H26.9        The patient has the following additional risks and recommendations for perioperative complications:        POSSIBLE SLEEP APNEA: Will follow up for sleep study referral      MEDICATION INSTRUCTIONS:  Patient is on no chronic medications    RECOMMENDATION:  APPROVAL GIVEN to proceed with proposed procedure, without further diagnostic evaluation.    Return if symptoms worsen  or fail to improve.    Signed Electronically by: Roya Peterson MD    Copy of this evaluation report is provided to requesting physician.    Preop [facility] Preop Guidelines    Revised Cardiac Risk Index

## 2020-11-29 ENCOUNTER — HEALTH MAINTENANCE LETTER (OUTPATIENT)
Age: 40
End: 2020-11-29

## 2021-01-15 ENCOUNTER — HEALTH MAINTENANCE LETTER (OUTPATIENT)
Age: 41
End: 2021-01-15

## 2021-03-16 DIAGNOSIS — J30.2 SEASONAL ALLERGIC RHINITIS, UNSPECIFIED TRIGGER: ICD-10-CM

## 2021-03-17 RX ORDER — FLUTICASONE PROPIONATE 50 MCG
SPRAY, SUSPENSION (ML) NASAL
Qty: 48 ML | Refills: 0 | Status: SHIPPED | OUTPATIENT
Start: 2021-03-17 | End: 2021-05-11

## 2021-03-17 NOTE — TELEPHONE ENCOUNTER
Prescription approved per [facility] Refill Protocol.    Radha Noriega RN

## 2021-04-15 ENCOUNTER — IMMUNIZATION (OUTPATIENT)
Dept: NURSING | Facility: CLINIC | Age: 41
End: 2021-04-15
Payer: COMMERCIAL

## 2021-04-15 PROCEDURE — 0001A PR COVID VAC PFIZER DIL RECON 30 MCG/0.3 ML IM: CPT

## 2021-04-15 PROCEDURE — 91300 PR COVID VAC PFIZER DIL RECON 30 MCG/0.3 ML IM: CPT

## 2021-05-06 ENCOUNTER — IMMUNIZATION (OUTPATIENT)
Dept: NURSING | Facility: CLINIC | Age: 41
End: 2021-05-06
Attending: NURSE PRACTITIONER
Payer: COMMERCIAL

## 2021-05-06 PROCEDURE — 0002A PR COVID VAC PFIZER DIL RECON 30 MCG/0.3 ML IM: CPT

## 2021-05-06 PROCEDURE — 91300 PR COVID VAC PFIZER DIL RECON 30 MCG/0.3 ML IM: CPT

## 2021-05-11 ENCOUNTER — OFFICE VISIT (OUTPATIENT)
Dept: FAMILY MEDICINE | Facility: CLINIC | Age: 41
End: 2021-05-11
Payer: COMMERCIAL

## 2021-05-11 VITALS
RESPIRATION RATE: 18 BRPM | SYSTOLIC BLOOD PRESSURE: 106 MMHG | TEMPERATURE: 98.1 F | HEIGHT: 68 IN | BODY MASS INDEX: 30.16 KG/M2 | HEART RATE: 90 BPM | DIASTOLIC BLOOD PRESSURE: 78 MMHG | OXYGEN SATURATION: 99 % | WEIGHT: 199 LBS

## 2021-05-11 DIAGNOSIS — Z11.59 NEED FOR HEPATITIS C SCREENING TEST: ICD-10-CM

## 2021-05-11 DIAGNOSIS — Z00.00 ROUTINE GENERAL MEDICAL EXAMINATION AT A HEALTH CARE FACILITY: Primary | ICD-10-CM

## 2021-05-11 DIAGNOSIS — L74.513 HYPERHIDROSIS OF FEET: ICD-10-CM

## 2021-05-11 DIAGNOSIS — R06.83 SNORING: ICD-10-CM

## 2021-05-11 DIAGNOSIS — L74.512 HYPERHIDROSIS OF HANDS: ICD-10-CM

## 2021-05-11 DIAGNOSIS — J30.2 SEASONAL ALLERGIC RHINITIS, UNSPECIFIED TRIGGER: ICD-10-CM

## 2021-05-11 PROCEDURE — 99395 PREV VISIT EST AGE 18-39: CPT | Performed by: FAMILY MEDICINE

## 2021-05-11 PROCEDURE — 99213 OFFICE O/P EST LOW 20 MIN: CPT | Mod: 25 | Performed by: FAMILY MEDICINE

## 2021-05-11 RX ORDER — FLUTICASONE PROPIONATE 50 MCG
2 SPRAY, SUSPENSION (ML) NASAL DAILY
Qty: 48 ML | Refills: 3 | Status: SHIPPED | OUTPATIENT
Start: 2021-05-11 | End: 2022-04-29

## 2021-05-11 ASSESSMENT — ENCOUNTER SYMPTOMS
CONSTIPATION: 0
FREQUENCY: 0
PARESTHESIAS: 0
HEADACHES: 0
JOINT SWELLING: 0
HEARTBURN: 0
DIARRHEA: 0
DYSURIA: 0
HEMATURIA: 0
EYE PAIN: 0
WEAKNESS: 0
ABDOMINAL PAIN: 0
NERVOUS/ANXIOUS: 0
COUGH: 0
SORE THROAT: 0
CHILLS: 0
HEMATOCHEZIA: 0
ARTHRALGIAS: 0
NAUSEA: 0
DIZZINESS: 0
FEVER: 0
PALPITATIONS: 0
MYALGIAS: 0
SHORTNESS OF BREATH: 0

## 2021-05-11 ASSESSMENT — MIFFLIN-ST. JEOR: SCORE: 1792.16

## 2021-05-11 ASSESSMENT — PAIN SCALES - GENERAL: PAINLEVEL: NO PAIN (0)

## 2021-05-11 NOTE — PROGRESS NOTES
SUBJECTIVE:   CC: Anival Galvez is an 40 year old male who presents for preventative health visit.       Patient has been advised of split billing requirements and indicates understanding: Yes  Healthy Habits:     Getting at least 3 servings of Calcium per day:  NO    Bi-annual eye exam:  Yes    Dental care twice a year:  Yes    Sleep apnea or symptoms of sleep apnea:  Excessive snoring    Diet:  Regular (no restrictions)    Frequency of exercise:  1 day/week    Duration of exercise:  Less than 15 minutes    Taking medications regularly:  Yes    Medication side effects:  None    PHQ-2 Total Score: 0    Additional concerns today:  Yes         Pt has been snoring often, his wife has been complaining about it, he snoring loudly, he feels sometimes tired during the day and sleepy.    Allergies under good control, with using flonase daily and sometimes claritin D.    Today's PHQ-2 Score:   PHQ-2 ( 1999 Pfizer) 5/11/2021   Q1: Little interest or pleasure in doing things 0   Q2: Feeling down, depressed or hopeless 0   PHQ-2 Score 0   Q1: Little interest or pleasure in doing things Not at all   Q2: Feeling down, depressed or hopeless Not at all   PHQ-2 Score 0       Abuse: Current or Past(Physical, Sexual or Emotional)- No  Do you feel safe in your environment? Yes    Have you ever done Advance Care Planning? (For example, a Health Directive, POLST, or a discussion with a medical provider or your loved ones about your wishes): No, advance care planning information given to patient to review.  Patient plans to discuss their wishes with loved ones or provider.      Social History     Tobacco Use     Smoking status: Former Smoker     Packs/day: 1.00     Years: 5.00     Pack years: 5.00     Types: Cigarettes     Smokeless tobacco: Never Used   Substance Use Topics     Alcohol use: Yes     Alcohol/week: 0.0 standard drinks     Comment: seldom     If you drink alcohol do you typically have >3 drinks per day or >7 drinks per  week? No    Alcohol Use 5/11/2021   Prescreen: >3 drinks/day or >7 drinks/week? No   Prescreen: >3 drinks/day or >7 drinks/week? -       Last PSA: No results found for: PSA    Reviewed orders with patient. Reviewed health maintenance and updated orders accordingly - Yes  Current Outpatient Medications   Medication Sig Dispense Refill     famotidine (PEPCID) 20 MG tablet Take 1 tablet (20 mg) by mouth 2 times daily 60 tablet 3     fluticasone (FLONASE) 50 MCG/ACT nasal spray INSTILL 2 SPRAYS INTO BOTH NOSTRILS DAILY 48 mL 0     No Known Allergies    Reviewed and updated as needed this visit by clinical staff                 Reviewed and updated as needed this visit by Provider                Past Medical History:   Diagnosis Date     CARDIOVASCULAR SCREENING; LDL GOAL LESS THAN 160 5/12/2017     Globus sensation 5/12/2017     Goiter 5/12/2017     Osgood-Schlatter's disease 3/24/2016     Seasonal allergic rhinitis 3/24/2016      Past Surgical History:   Procedure Laterality Date     wisdom teeth         Review of Systems   Constitutional: Negative for chills and fever.   HENT: Negative for congestion, ear pain, hearing loss and sore throat.    Eyes: Negative for pain and visual disturbance.   Respiratory: Negative for cough and shortness of breath.    Cardiovascular: Negative for chest pain, palpitations and peripheral edema.   Gastrointestinal: Negative for abdominal pain, constipation, diarrhea, heartburn, hematochezia and nausea.   Genitourinary: Negative for discharge, dysuria, frequency, genital sores, hematuria, impotence and urgency.   Musculoskeletal: Negative for arthralgias, joint swelling and myalgias.   Skin: Negative for rash.   Neurological: Negative for dizziness, weakness, headaches and paresthesias.   Psychiatric/Behavioral: Negative for mood changes. The patient is not nervous/anxious.          OBJECTIVE:   There were no vitals taken for this visit.    Physical Exam  GENERAL: healthy, alert and no  distress  EYES: Eyes grossly normal to inspection, PERRL and conjunctivae and sclerae normal  HENT: normal cephalic/atraumatic, ear canals and TM's normal, nasal mucosa edematous , oropharynx clear and oral mucous membranes moist  NECK: no adenopathy, no asymmetry, masses, or scars and thyroid normal to palpation  RESP: lungs clear to auscultation - no rales, rhonchi or wheezes  CV: regular rate and rhythm, normal S1 S2, no S3 or S4, no murmur, click or rub, no peripheral edema and peripheral pulses strong  ABDOMEN: soft, nontender, no hepatosplenomegaly, no masses and bowel sounds normal  MS: no gross musculoskeletal defects noted, no edema  SKIN: no suspicious lesions or rashes  Admits to feeling sweaty of the hands, and feet.  NEURO: Normal strength and tone, mentation intact and speech normal  PSYCH: mentation appears normal, affect normal/bright        ASSESSMENT/PLAN:   1. Need for hepatitis C screening test    - **Hepatitis C Screen Reflex to RNA FUTURE anytime; Future    2. Routine general medical examination at a health care facility  Today I counseled the patient about diet, regular exercise and weight loss planning.  Will schedule for his labs fasting   - Lipid panel reflex to direct LDL Fasting; Future  - **A1C FUTURE anytime; Future  - **Glucose FUTURE anytime; Future    3. Seasonal allergic rhinitis, unspecified trigger  Continue on   - fluticasone (FLONASE) 50 MCG/ACT nasal spray; Spray 2 sprays into both nostrils daily  Dispense: 48 mL; Refill: 3    4. Hyperhidrosis of feet  Discussed options for treatment, will start on   - aluminum chloride (DRYSOL) 20 % external solution; Apply topically At Bedtime Apply on the hands and soles at night time, then wash in the morning, continue daily until the sweating decrease, then you can switch to once or twice a week.  Dispense: 120 mL; Refill: 3    5. Hyperhidrosis of hands  As abpve  - aluminum chloride (DRYSOL) 20 % external solution; Apply topically At  "Bedtime Apply on the hands and soles at night time, then wash in the morning, continue daily until the sweating decrease, then you can switch to once or twice a week.  Dispense: 120 mL; Refill: 3    6. Snoring  Stop-bang score of 3 refer to sleep study  - [place]AL[place]NAG[place] [place] [facility] [phone] (Age 18 and up[place] Future    Patient has been advised of split billing requirements and indicates understanding: Yes  COUNSELING:   Reviewed preventive health counseling, as reflected in patient instructions       Regular exercise       Healthy diet/nutrition    Estimated body mass index is 30.27 kg/m  as calculated from the following:    Height as of 2/21/20: 1.727 m (5' 8\").    Weight as of 9/10/20: 90.3 kg (199 lb 1.6 oz).     Weight management plan: Discussed healthy diet and exercise guidelines    He reports that he has quit smoking. His smoking use included cigarettes. He has a 5.00 pack-year smoking history. He has never used smokeless tobacco.      Counseling Resources:  ATP IV Guidelines  Pooled Cohorts Equation Calculator  FRAX Risk Assessment  ICSI Preventive Guidelines  Dietary Guidelines for Americans, 2010  USDA's MyPlate  ASA Prophylaxis  Lung CA Screening    Alexsander Funes MD  [facility]  "

## 2021-05-25 ENCOUNTER — ALLIED HEALTH/NURSE VISIT (OUTPATIENT)
Dept: FAMILY MEDICINE | Facility: CLINIC | Age: 41
End: 2021-05-25
Payer: COMMERCIAL

## 2021-05-25 DIAGNOSIS — Z00.00 ROUTINE GENERAL MEDICAL EXAMINATION AT A HEALTH CARE FACILITY: ICD-10-CM

## 2021-05-25 DIAGNOSIS — Z11.59 NEED FOR HEPATITIS C SCREENING TEST: ICD-10-CM

## 2021-05-25 DIAGNOSIS — Z23 ENCOUNTER FOR IMMUNIZATION: Primary | ICD-10-CM

## 2021-05-25 LAB
HBA1C MFR BLD: 5.8 % (ref 0–5.6)
HCV AB SERPL QL IA: NONREACTIVE

## 2021-05-25 PROCEDURE — 90746 HEPB VACCINE 3 DOSE ADULT IM: CPT

## 2021-05-25 PROCEDURE — 86803 HEPATITIS C AB TEST: CPT | Performed by: FAMILY MEDICINE

## 2021-05-25 PROCEDURE — 82947 ASSAY GLUCOSE BLOOD QUANT: CPT | Performed by: FAMILY MEDICINE

## 2021-05-25 PROCEDURE — 83036 HEMOGLOBIN GLYCOSYLATED A1C: CPT | Performed by: FAMILY MEDICINE

## 2021-05-25 PROCEDURE — 99207 PR NO CHARGE NURSE ONLY: CPT

## 2021-05-25 PROCEDURE — 80061 LIPID PANEL: CPT | Performed by: FAMILY MEDICINE

## 2021-05-25 PROCEDURE — 90471 IMMUNIZATION ADMIN: CPT

## 2021-05-25 PROCEDURE — 36415 COLL VENOUS BLD VENIPUNCTURE: CPT | Performed by: FAMILY MEDICINE

## 2021-05-26 LAB
CHOLEST SERPL-MCNC: 230 MG/DL
GLUCOSE SERPL-MCNC: 99 MG/DL (ref 70–99)
HDLC SERPL-MCNC: 41 MG/DL
LDLC SERPL CALC-MCNC: 129 MG/DL
NONHDLC SERPL-MCNC: 189 MG/DL
TRIGL SERPL-MCNC: 301 MG/DL

## 2021-06-24 NOTE — PROGRESS NOTES
"Anival Galvez is a 40 year old male who is being evaluated via a billable video visit.       The patient has been notified of following:      \"This video visit will be conducted via a call between you and your physician/provider. We have found that certain health care needs can be provided without the need for an in-person physical exam.  This service lets us provide the care you need with a video conversation.  If a prescription is necessary we can send it directly to your pharmacy.  If lab work is needed we can place an order for that and you can then stop by our lab to have the test done at a later time.     Video visits are billed at different rates depending on your insurance coverage.  Please reach out to your insurance provider with any questions.     If during the course of the call the physician/provider feels a video visit is not appropriate, you will not be charged for this service.\"     Patient has given verbal consent for Video visit? Yes  How would you like to obtain your AVS? Mail a copy  If you are dropped from the video visit, the video invite should be resent to: Text to cell phone: -  Will anyone else be joining your video visit? No  If patient encounters technical issues they should call [phone]      Video-Visit Details     Type of service:  Video Visit     Video Start Time: 1pm  Video End Time: 1:34 PM    Originating Location (pt. Location): Home     Distant Location (provider location):  [facility]      Platform used for Video Visit: Pager    Virtual visit for concern for sleep disordered breathing.     Assessment:  -Increase pretest probability for obstructive sleep apnea with STOP-BANG score of 4    Plan:  -He appears to be a candidate for either home sleep testing or in lab PSG.  We agreed to proceed with a nocturnal T3 home sleep test, orders placed today.    SUBJECTIVE:  Anival Galvez is a 40 year old year old male.    PMHx of globus sensation, " osgood-schlatter's disease.    Concerns for daytime sleepiness and loud snoring noted by Dr. Funes at annual exam on 5/11/2021.    Today -he presents due to a multiple year history of snoring, and more recently that his wife has had some concerns over observed apnea.  He believes this may have worsened over the last year, related to an approximately 15 pound weight gain during Covid.  While he denies any daytime sleepiness, he does feel that he is more fatigued with less energy and less motivation during the day.    Family history of mother and brother snoring, though no diagnosis of sleep disordered breathing.    He denies any sleepwalking, dream enacting behavior, restless leg symptoms, bruxism.    During weekdays, he is usually in bed around 1 AM and typically falls asleep quickly, no frequent awakenings, and will awaken by alarm between 730-8 AM, no regular naps.    On weekends, usually in bed around midnight, and will sleep until waking up naturally between 8-8:30 AM and may take a 30+ minute nap every other day on weekends.    Social history:  , works as a  over [employer] of 6 people.  Has a 4-year-old daughter.         Past medical history:    Patient Active Problem List    Diagnosis Date Noted     Cataract of left eye, unspecified cataract type 09/10/2020     Priority: Medium     Globus sensation 05/12/2017     Priority: Medium     Seasonal allergic rhinitis 03/24/2016     Priority: Medium     Osgood-Schlatter's disease 03/24/2016     Priority: Medium       10 point ROS of systems including Constitutional, Eyes, Respiratory, Cardiovascular, Gastroenterology, Genitourinary, Integumentary, Muscularskeletal, Psychiatric were all negative except for pertinent positives noted in my HPI.    Current Outpatient Medications   Medication Sig Dispense Refill     aluminum chloride (DRYSOL) 20 % external solution Apply topically At Bedtime Apply on the hands and soles at night time, then wash in  the morning, continue daily until the sweating decrease, then you can switch to once or twice a week. 120 mL 3     famotidine (PEPCID) 20 MG tablet Take 1 tablet (20 mg) by mouth 2 times daily 60 tablet 3     fluticasone (FLONASE) 50 MCG/ACT nasal spray Spray 2 sprays into both nostrils daily 48 mL 3       OBJECTIVE:  There were no vitals taken for this visit.    Physical Exam     ---  This note was written with the assistance of the Dragon voice-dictation technology software. The final document, although reviewed, may contain errors. For corrections, please contact the office.    Chaz Stone MD    Sleep Medicine  [facility]  ([phone])  [facility]  ([phone])

## 2021-06-25 ENCOUNTER — VIRTUAL VISIT (OUTPATIENT)
Dept: SLEEP MEDICINE | Facility: CLINIC | Age: 41
End: 2021-06-25
Attending: FAMILY MEDICINE
Payer: COMMERCIAL

## 2021-06-25 VITALS — BODY MASS INDEX: 29.55 KG/M2 | HEIGHT: 68 IN | WEIGHT: 195 LBS

## 2021-06-25 DIAGNOSIS — R06.81 APNEA: Primary | ICD-10-CM

## 2021-06-25 DIAGNOSIS — R53.82 CHRONIC FATIGUE: ICD-10-CM

## 2021-06-25 DIAGNOSIS — R06.83 SNORING: ICD-10-CM

## 2021-06-25 PROCEDURE — 99203 OFFICE O/P NEW LOW 30 MIN: CPT | Mod: 95 | Performed by: FAMILY MEDICINE

## 2021-06-25 ASSESSMENT — MIFFLIN-ST. JEOR: SCORE: 1773.88

## 2021-06-25 NOTE — PROGRESS NOTES
"Does patient have any form of state insurance? no  Do you have wifi? yes  Do you have a smart phone? yes  Can you download an everardo on your phone comfortably with out assistance? Yes  Can you watch a Youtube video? Yes    Anival is a 40 year old who is being evaluated via a billable video visit.      How would you like to obtain your AVS? MyChart  If the video visit is dropped, the invitation should be resent by: Text to cell phone: [phone]  Will anyone else be joining your video visit? Pilar Diggs  {If patient encounters technical issues they should call [phone] :[phone]}    Video Start Time: {video visit start/end time for provider to select:152948}  Video-Visit Details    Type of service:  Video Visit    Video End Time:{video visit start/end time for provider to select:887837}    Originating Location (pt. Location): {video visit patient location:483746::\"Home\"}    Distant Location (provider location):  [facility]     Platform used for Video Visit: {Virtual Visit Platforms:530397::\"[facility]\"}  "

## 2021-06-28 ENCOUNTER — TELEPHONE (OUTPATIENT)
Dept: SLEEP MEDICINE | Facility: CLINIC | Age: 41
End: 2021-06-28

## 2021-06-28 NOTE — TELEPHONE ENCOUNTER
----- Message from Jessi Delaney CMA sent at 6/28/2021  7:47 AM CDT -----  Regarding: schedule HST, return visit Dr. Stone  Insurance: [facility]

## 2021-06-28 NOTE — TELEPHONE ENCOUNTER
Attempted to reach Anival today to schedule HST and return visit with Dr. Stone for test results. No answer. [facility]      Daisy ROMAN CMA, SLEEP MEDICINE, 6/28/2021 9:52 AM

## 2021-06-28 NOTE — PROGRESS NOTES
Attempted to reach Anival today to schedule HST and return visit with Dr. Stone for test results. No answer. [facility]      Daisy ROMAN CMA, SLEEP MEDICINE, 6/28/2021 9:52 AM

## 2021-06-28 NOTE — PROGRESS NOTES
AVS has been mailed to patients home address June 28, 2021      Daisy ROMAN [facility] Sleep Medicine

## 2021-09-08 ENCOUNTER — OFFICE VISIT (OUTPATIENT)
Dept: SLEEP MEDICINE | Facility: CLINIC | Age: 41
End: 2021-09-08
Attending: FAMILY MEDICINE
Payer: COMMERCIAL

## 2021-09-08 DIAGNOSIS — R06.83 SNORING: ICD-10-CM

## 2021-09-08 DIAGNOSIS — R06.81 APNEA: ICD-10-CM

## 2021-09-08 DIAGNOSIS — R53.82 CHRONIC FATIGUE: ICD-10-CM

## 2021-09-08 PROCEDURE — G0399 HOME SLEEP TEST/TYPE 3 PORTA: HCPCS | Performed by: FAMILY MEDICINE

## 2021-09-09 ENCOUNTER — DOCUMENTATION ONLY (OUTPATIENT)
Dept: SLEEP MEDICINE | Facility: CLINIC | Age: 41
End: 2021-09-09
Payer: COMMERCIAL

## 2021-09-10 NOTE — PROGRESS NOTES
This HSAT was performed using a Noxturnal T3 device which recorded snore, sound, movement activity, body position, nasal pressure, oronasal thermal airflow, pulse, oximetry and both chest and abdominal respiratory effort. HSAT data was restricted to the time patient states they were in bed.     HSAT was scored using 1B 4% hypopnea rule.     HST AHI (Non-PAT): 7.2  Snoring was reported as intermittent.  Time with SpO2 below 89% was 1 minutes.   Overall signal quality was good     Pt will follow up with sleep provider to determine appropriate therapy.     HST POST-STUDY QUESTIONNAIRE    1. What time did you go to bed?  1215  2. How long do you think it took to fall asleep?  15-30 min  3. What time did you wake up to start the day?  0740  4. Did you get up during the night at all?  yes  5. If you woke up, do you remember approximately what time(s)? 8907  6. Did you have any difficulty with the equipment?  No  7. Did you us any type of treatment with this study?  None  8. Was the head of the bed elevated? No  9. Did you sleep in a recliner?  No  10. Did you stop using CPAP at least 3 days before this test?  NA  11. Any other information you'd like us to know? none

## 2021-09-14 NOTE — PROCEDURES
"HOME SLEEP STUDY INTERPRETATION    Patient: Anival Galvez  MRN: [id number]  YOB: 1980  Study Date: 2021  Referring Provider: Alexsander Funes  Ordering Provider: Chaz Stone MD, MD     Indications for Home Study: Anival Galvez is a 40 year old male with a history of globus sensation, osgood-schlatter's disease who presents with symptoms suggestive of obstructive sleep apnea.    Estimated body mass index is 29.66 kg/m  as calculated from the following:    Height as of 21: 1.727 m (5' 7.99\").    Weight as of 21: 88.5 kg (195 lb).  Total score - [place]: 1 (2021  5:10 PM)  STOP-BAN/8    Data: A full night home sleep study was performed recording the standard physiologic parameters including body position, movement, sound, nasal pressure, thermal oral airflow, chest and abdominal movements with respiratory inductance plethysmography, and oxygen saturation by pulse oximetry. Pulse rate was estimated by oximetry recording. This study was considered adequate based on > 4 hours of quality oximetry and respiratory recording. As specified by the AASM Manual for the Scoring of Sleep and Associated events, version 2.3, Rule VIII.D 1B, 4% oxygen desaturation scoring for hypopneas is used as a standard of care on all home sleep apnea testing.    Analysis Time:  433.9 minutes    Respiration:   Sleep Associated Hypoxemia: sustained hypoxemia was not present. Average oxygen saturation was 95.3%.  Time with saturation less than or equal to 88% was 0 minutes. The lowest oxygen saturation was 84%.   Snoring: Snoring was present.  Respiratory events: The home study revealed a presence of 8 obstructive apneas and 0 mixed and central apneas. There were 44 hypopneas resulting in a combined apnea/hypopnea index [AHI] of 7.2 events per hour.  AHI was 0 per hour supine, - per hour prone, 1.2 per hour on left side, and 10.5 per hour on right side.   Pattern: Excluding events noted above, " respiratory rate and pattern was Normal.    Position: Percent of time spent: supine - 0.2%, prone - 0%, on left - 35.5%, on right - 64.4%.    Heart Rate: By pulse oximetry normal rate was noted.     Assessment:   Mild obstructive sleep apnea.  Sleep associated hypoxemia was not present.    Recommendations:  Consider auto-CPAP at 5-15 cmH2O, oral appliance therapy or polysomnography with full night PAP titration.  Suggest optimizing sleep hygiene and avoiding sleep deprivation.  Weight management.    Diagnosis Code(s): Obstructive Sleep Apnea G47.33    Chaz Stone MD, MD, September 14, 2021   Diplomate, American Board of Family Medicine, Sleep Medicine

## 2021-09-17 NOTE — PROGRESS NOTES
"Anival is a 40 year old who is being evaluated via a billable video visit.      How would you like to obtain your AVS? MyChart  If the video visit is dropped, the invitation should be resent by: Text to cell phone: [phone]  Will anyone else be joining your video visit? No  {If patient encounters technical issues they should call [phone] :[id number]}    Video Start Time: {video visit start/end time for provider to select:152948}  Video-Visit Details    Type of service:  Video Visit    Video End Time:{video visit start/end time for provider to select:152948}    Originating Location (pt. Location): {video visit patient location:021313::\"Home\"}    Distant Location (provider location):  [facility]     Platform used for Video Visit: {Virtual Visit Platforms:869872::\"[employer]\"}  "

## 2021-09-17 NOTE — PATIENT INSTRUCTIONS

## 2021-09-19 ENCOUNTER — HEALTH MAINTENANCE LETTER (OUTPATIENT)
Age: 41
End: 2021-09-19

## 2021-09-20 ENCOUNTER — MYC MEDICAL ADVICE (OUTPATIENT)
Dept: SLEEP MEDICINE | Facility: CLINIC | Age: 41
End: 2021-09-20

## 2021-09-20 ENCOUNTER — VIRTUAL VISIT (OUTPATIENT)
Dept: SLEEP MEDICINE | Facility: CLINIC | Age: 41
End: 2021-09-20
Payer: COMMERCIAL

## 2021-09-20 DIAGNOSIS — G47.10 HYPERSOMNIA: ICD-10-CM

## 2021-09-20 DIAGNOSIS — G47.33 OSA (OBSTRUCTIVE SLEEP APNEA): Primary | ICD-10-CM

## 2021-09-20 PROCEDURE — 99213 OFFICE O/P EST LOW 20 MIN: CPT | Mod: 95 | Performed by: FAMILY MEDICINE

## 2021-09-20 NOTE — PROGRESS NOTES
"Anival Galvez is a 40 year old male who is being evaluated via a billable video visit.       The patient has been notified of following:      \"This video visit will be conducted via a call between you and your physician/provider. We have found that certain health care needs can be provided without the need for an in-person physical exam.  This service lets us provide the care you need with a video conversation.  If a prescription is necessary we can send it directly to your pharmacy.  If lab work is needed we can place an order for that and you can then stop by our lab to have the test done at a later time.     Video visits are billed at different rates depending on your insurance coverage.  Please reach out to your insurance provider with any questions.     If during the course of the call the physician/provider feels a video visit is not appropriate, you will not be charged for this service.\"     Patient has given verbal consent for Video visit? Yes  How would you like to obtain your AVS? Mail a copy  If you are dropped from the video visit, the video invite should be resent to: Text to cell phone: -  Will anyone else be joining your video visit? No  If patient encounters technical issues they should call [phone]      Video-Visit Details     Type of service:  Video Visit     Video Start Time: 2pm  Video End Time: 2:20pm    Originating Location (pt. Location): Home     Distant Location (provider location):  [facility]      Platform used for Video Visit: OnLive    Virtual visit for review of home sleep test results.     Assessment:  - Mild RAFAEL without sleep-associated hypoxemia  - He is relieved that not severe, but he still interested in treatment to resolve snoring and improve daytime fatigue  - Reviewed treatment options of CPAP, oral appliances, weight management, upper airway surgery.    Plan:  - Patient will contact with treatment decision via " "MyChart.    SUBJECTIVE:  Anival Galvez is a 40 year old year old male.    2021 - initial consult.  he presents due to a multiple year history of snoring, and more recently that his wife has had some concerns over observed apnea.  He believes this may have worsened over the last year, related to an approximately 15 pound weight gain during Covid.  While he denies any daytime sleepiness, he does feel that he is more fatigued with less energy and less motivation during the day.  A/P for HST.    Today - We reviewed his HST in detail.    NICK Total Score: 7     HOME SLEEP STUDY INTERPRETATION     Patient: Anival Galvez  MRN: [id number]  YOB: 1980  Study Date: 2021  Referring Provider: Alexsander Funes  Ordering Provider: Chaz Stone MD, MD     Indications for Home Study: Anival Galvez is a 40 year old male with a history of globus sensation, osgood-schlatter's disease who presents with symptoms suggestive of obstructive sleep apnea.     Estimated body mass index is 29.66 kg/m  as calculated from the following:    Height as of 21: 1.727 m (5' 7.99\").    Weight as of 21: 88.5 kg (195 lb).  Total score - [place]: 1 (2021  5:10 PM)  STOP-BAN/8     Data: A full night home sleep study was performed recording the standard physiologic parameters including body position, movement, sound, nasal pressure, thermal oral airflow, chest and abdominal movements with respiratory inductance plethysmography, and oxygen saturation by pulse oximetry. Pulse rate was estimated by oximetry recording. This study was considered adequate based on > 4 hours of quality oximetry and respiratory recording. As specified by the AASM Manual for the Scoring of Sleep and Associated events, version 2.3, Rule VIII.D 1B, 4% oxygen desaturation scoring for hypopneas is used as a standard of care on all home sleep apnea testing.     Analysis Time:  433.9 minutes     Respiration:   Sleep Associated Hypoxemia: " sustained hypoxemia was not present. Average oxygen saturation was 95.3%.  Time with saturation less than or equal to 88% was 0 minutes. The lowest oxygen saturation was 84%.   Snoring: Snoring was present.  Respiratory events: The home study revealed a presence of 8 obstructive apneas and 0 mixed and central apneas. There were 44 hypopneas resulting in a combined apnea/hypopnea index [AHI] of 7.2 events per hour.  AHI was 0 per hour supine, - per hour prone, 1.2 per hour on left side, and 10.5 per hour on right side.   Pattern: Excluding events noted above, respiratory rate and pattern was Normal.     Position: Percent of time spent: supine - 0.2%, prone - 0%, on left - 35.5%, on right - 64.4%.     Heart Rate: By pulse oximetry normal rate was noted.      Assessment:   Mild obstructive sleep apnea.  Sleep associated hypoxemia was not present.     Recommendations:  Consider auto-CPAP at 5-15 cmH2O, oral appliance therapy or polysomnography with full night PAP titration.  Suggest optimizing sleep hygiene and avoiding sleep deprivation.  Weight management.     Diagnosis Code(s): Obstructive Sleep Apnea G47.33    Past medical history:    Patient Active Problem List    Diagnosis Date Noted     Cataract of left eye, unspecified cataract type 09/10/2020     Priority: Medium     Globus sensation 05/12/2017     Priority: Medium     Seasonal allergic rhinitis 03/24/2016     Priority: Medium     Osgood-Schlatter's disease 03/24/2016     Priority: Medium       10 point ROS of systems including Constitutional, Eyes, Respiratory, Cardiovascular, Gastroenterology, Genitourinary, Integumentary, Muscularskeletal, Psychiatric were all negative except for pertinent positives noted in my HPI.    Current Outpatient Medications   Medication Sig Dispense Refill     aluminum chloride (DRYSOL) 20 % external solution Apply topically At Bedtime Apply on the hands and soles at night time, then wash in the morning, continue daily until the  sweating decrease, then you can switch to once or twice a week. 120 mL 3     fluticasone (FLONASE) 50 MCG/ACT nasal spray Spray 2 sprays into both nostrils daily 48 mL 3       OBJECTIVE:  There were no vitals taken for this visit.    Physical Exam     ---  This note was written with the assistance of the Dragon voice-dictation technology software. The final document, although reviewed, may contain errors. For corrections, please contact the office.    Chaz Stone MD    Sleep Medicine  [facility]  ([phone])  [facility]  ([phone])

## 2021-12-15 ENCOUNTER — IMMUNIZATION (OUTPATIENT)
Dept: NURSING | Facility: CLINIC | Age: 41
End: 2021-12-15
Payer: COMMERCIAL

## 2021-12-15 PROCEDURE — 91300 PR COVID VAC PFIZER DIL RECON 30 MCG/0.3 ML IM: CPT

## 2021-12-15 PROCEDURE — 90686 IIV4 VACC NO PRSV 0.5 ML IM: CPT

## 2021-12-15 PROCEDURE — 90471 IMMUNIZATION ADMIN: CPT

## 2021-12-15 PROCEDURE — 0004A PR COVID VAC PFIZER DIL RECON 30 MCG/0.3 ML IM: CPT

## 2022-02-09 ENCOUNTER — E-VISIT (OUTPATIENT)
Dept: URGENT CARE | Facility: URGENT CARE | Age: 42
End: 2022-02-09
Payer: COMMERCIAL

## 2022-02-09 DIAGNOSIS — R19.7 DIARRHEA, UNSPECIFIED TYPE: Primary | ICD-10-CM

## 2022-02-09 PROCEDURE — 99421 OL DIG E/M SVC 5-10 MIN: CPT | Performed by: EMERGENCY MEDICINE

## 2022-02-09 NOTE — PATIENT INSTRUCTIONS
Viral or Food-Borne Diarrhea (Adult)    Diarrhea caused by a virus is often called viral gastroenteritis. Many people call it the stomach flu, but it has nothing to do with the flu. The virus that causes diarrhea affects the stomach and intestinal tract; recently Norovirus has been causing similar symptoms. It often lasts from 2 to 7 days. Diarrhea is the passing of loose, watery stools 3 or more times a day.     This should run its course and you should continue taking Imodium to prevent severe water loss unless you have any dark, pus-like or bloody diarrhea; then you should not take Imodium and go to the ER immediately. If you have abdominal pain, fevers or this persists for another 4 days you should be seen in clinic.  Symptoms  Along with diarrhea, you may have these symptoms:    Belly (abdominal) pain and cramping    Nausea and vomiting    Loss of bowel control    Fever and chills    Bloody stools  The danger from repeated diarrhea is dehydration. This is when your body loses too much water and other fluids.   Antibiotics don't work well in treating this illness. But there are things you can do at home that will help.   Home care  Follow these home care tips:    If symptoms are severe, rest at home for the next 24 hours or until you are feeling better.    Wash your hands with soap and water or an alcohol-based . This helps prevent the spread of infection. Wash your hands after touching anyone who is sick.    Teach all people in your home when and how to wash their hands Wet your hands with clean, running water. Lather the backs of your hands, between your fingers, and under your nails. Scrub your hands for at least 20 seconds. If you need a timer, try humming the  Happy Birthday  song from beginning to end twice. Rinse your hands well. Dry them with a clean towel.    Wash your hands after using the toilet and before meals. Clean the toilet after each use.  Food preparation:    People with diarrhea  should not make food for others. When making food, wash your hands after touching anyone who is sick.    Wash your hands after using items that have been in contact with raw food. This includes cutting boards, countertops, and knives.    Keep uncooked meats away from cooked and ready-to-eat foods.  Medicines:    You may use acetaminophen or nonsteroidal anti-inflammatory drugs (NSAIDS) such as ibuprofen or naproxen to control fever unless another medicine was prescribed. In addition:  ? Talk with your healthcare provider before using these medicines if you have chronic liver or kidney disease, or ever had a stomach ulcer or GI (gastrointestinal) bleeding.  ? Don t give aspirin (or medicine that contains aspirin) to anyone younger than age 19 unless directed by the provider. Taking aspirin can put them at risk for Reye syndrome. This is a rare but very serious disorder. It most often affects the brain and the liver.  ? Don't use NSAID medicines if you are already taking one for another condition (such as arthritis) or if you are taking aspirin (such as for heart disease or after a stroke).    Anti-diarrhea medicine should be taken for this condition only if advised by your healthcare provider. Sometimes it can make your condition worse. If you have bloody diarrhea or fever, check with your provider before taking this type of medicine.  Diet:    Water and clear liquids are important so you don't get dehydrated. Drink small amounts at a time. Don't guzzle it down. If you are very dehydrated, sports drinks aren't a good choice. They have too much sugar and not enough electrolytes. In this case, commercially available products called oral rehydration solutions are best.    Caffeine, tobacco, and alcohol can make the diarrhea, cramping, and pain worse. Try to stop using these until you are fully recovered.    Don't force yourself to eat, especially if you have cramping, vomiting, or diarrhea. Don't eat large amounts at a  time, even if you are hungry. It may make you feel worse.    If you eat, don't have fatty, greasy, spicy, or fried foods.    Don't have any dairy products, as they can make diarrhea worse.  During the first 24 hours (the first full day) follow the diet below:     Drinks: Water, clear liquids, soft drinks without caffeine; ginger ale, mineral water (plain or flavored), decaffeinated tea and coffee    Soups: Clear broth, consommé, and bouillon    Desserts: Plain gelatin, ice pops, and fruit juice bars  During the next 24 hours (the second day) you may add these to the above if you are feeling better:     Hot cereal, plain toast, bread, rolls, crackers    Plain noodles, rice, mashed potatoes, chicken noodle or rice soup    Unsweetened canned fruit such as applesauce and bananas (not pineapple and citrus)    Limit fat intake to less than 15 grams per day. Don't eat margarine, butter, oils, mayonnaise, sauces, gravies, fried foods, peanut butter, meat, poultry, and fish.    Limit fiber. Don't eat raw or cooked vegetables, fresh fruits (except bananas), and bran cereals.    Limit caffeine and chocolate. No spices or seasonings except salt.  During the next 24 hours:    Slowly go back to a normal diet, as you feel better and your symptoms ease.    If at any time the diarrhea or cramping gets worse, go back to the simpler diet (above) or to clear liquids.  Follow-up care  Follow up with your healthcare provider, or as advised. Call if you aren't getting better in 24 hours or if the diarrhea lasts more than 1 week. This is even more important if you are in a high-risk group, such as:     Being an older adult    Having a weak immune system (such as from cancer treatment)    Having inflammatory bowel disease (Crohn's disease or colitis)    If a stool (diarrhea) sample was taken, you may call in 2 days (or as directed) for the results.   When to get medical advice  Call your healthcare provider right away if any of these occur:      More belly pain or constant lower right belly pain    Lasting vomiting (can't keep liquids down)    Frequent diarrhea (more than 5 times a day)    Blood in vomit or stool (black or red color)    Eating or drinking less    Dark urine, reduced urine output    Weakness, dizziness    Drowsiness    Fever of 100.4 F (38 C) or higher, or as directed by your provider    New rash    Symptoms get worse or you have new symptoms  Call 911  Call 911 if any of these occur:     Trouble breathing    Feeling confused    Severe drowsiness or trouble waking up    Fainting or loss of consciousness    Fast heart rate    Seizure    Stiff neck  Elinor last reviewed this educational content on 2/1/2021 2000-2021 [employer]. All rights reserved. This information is not intended as a substitute for professional medical care. Always follow your healthcare professional's instructions.

## 2022-04-29 ENCOUNTER — OFFICE VISIT (OUTPATIENT)
Dept: FAMILY MEDICINE | Facility: CLINIC | Age: 42
End: 2022-04-29
Payer: COMMERCIAL

## 2022-04-29 VITALS
HEIGHT: 68 IN | BODY MASS INDEX: 29.73 KG/M2 | TEMPERATURE: 97.9 F | WEIGHT: 196.2 LBS | DIASTOLIC BLOOD PRESSURE: 81 MMHG | RESPIRATION RATE: 18 BRPM | OXYGEN SATURATION: 97 % | HEART RATE: 83 BPM | SYSTOLIC BLOOD PRESSURE: 121 MMHG

## 2022-04-29 DIAGNOSIS — J30.2 SEASONAL ALLERGIC RHINITIS, UNSPECIFIED TRIGGER: ICD-10-CM

## 2022-04-29 DIAGNOSIS — L74.512 HYPERHIDROSIS OF HANDS: ICD-10-CM

## 2022-04-29 DIAGNOSIS — Z00.00 ROUTINE HISTORY AND PHYSICAL EXAMINATION OF ADULT: Primary | ICD-10-CM

## 2022-04-29 DIAGNOSIS — L74.513 HYPERHIDROSIS OF FEET: ICD-10-CM

## 2022-04-29 DIAGNOSIS — G47.33 OSA (OBSTRUCTIVE SLEEP APNEA): ICD-10-CM

## 2022-04-29 PROCEDURE — 90471 IMMUNIZATION ADMIN: CPT | Performed by: FAMILY MEDICINE

## 2022-04-29 PROCEDURE — 99396 PREV VISIT EST AGE 40-64: CPT | Mod: 25 | Performed by: FAMILY MEDICINE

## 2022-04-29 PROCEDURE — 90746 HEPB VACCINE 3 DOSE ADULT IM: CPT | Performed by: FAMILY MEDICINE

## 2022-04-29 RX ORDER — FLUTICASONE PROPIONATE 50 MCG
2 SPRAY, SUSPENSION (ML) NASAL DAILY
Qty: 48 ML | Refills: 3 | Status: SHIPPED | OUTPATIENT
Start: 2022-04-29 | End: 2023-09-12

## 2022-04-29 SDOH — ECONOMIC STABILITY: FOOD INSECURITY: WITHIN THE PAST 12 MONTHS, YOU WORRIED THAT YOUR FOOD WOULD RUN OUT BEFORE YOU GOT MONEY TO BUY MORE.: NEVER TRUE

## 2022-04-29 SDOH — HEALTH STABILITY: PHYSICAL HEALTH: ON AVERAGE, HOW MANY MINUTES DO YOU ENGAGE IN EXERCISE AT THIS LEVEL?: 20 MIN

## 2022-04-29 SDOH — ECONOMIC STABILITY: TRANSPORTATION INSECURITY
IN THE PAST 12 MONTHS, HAS LACK OF TRANSPORTATION KEPT YOU FROM MEETINGS, WORK, OR FROM GETTING THINGS NEEDED FOR DAILY LIVING?: NO

## 2022-04-29 SDOH — HEALTH STABILITY: PHYSICAL HEALTH: ON AVERAGE, HOW MANY DAYS PER WEEK DO YOU ENGAGE IN MODERATE TO STRENUOUS EXERCISE (LIKE A BRISK WALK)?: 3 DAYS

## 2022-04-29 SDOH — ECONOMIC STABILITY: TRANSPORTATION INSECURITY
IN THE PAST 12 MONTHS, HAS THE LACK OF TRANSPORTATION KEPT YOU FROM MEDICAL APPOINTMENTS OR FROM GETTING MEDICATIONS?: NO

## 2022-04-29 SDOH — ECONOMIC STABILITY: FOOD INSECURITY: WITHIN THE PAST 12 MONTHS, THE FOOD YOU BOUGHT JUST DIDN'T LAST AND YOU DIDN'T HAVE MONEY TO GET MORE.: NEVER TRUE

## 2022-04-29 SDOH — ECONOMIC STABILITY: INCOME INSECURITY: HOW HARD IS IT FOR YOU TO PAY FOR THE VERY BASICS LIKE FOOD, HOUSING, MEDICAL CARE, AND HEATING?: NOT HARD AT ALL

## 2022-04-29 SDOH — ECONOMIC STABILITY: INCOME INSECURITY: IN THE LAST 12 MONTHS, WAS THERE A TIME WHEN YOU WERE NOT ABLE TO PAY THE MORTGAGE OR RENT ON TIME?: NO

## 2022-04-29 ASSESSMENT — LIFESTYLE VARIABLES
HOW OFTEN DO YOU HAVE SIX OR MORE DRINKS ON ONE OCCASION: NEVER
SKIP TO QUESTIONS 9-10: 1
AUDIT-C TOTAL SCORE: 1
HOW MANY STANDARD DRINKS CONTAINING ALCOHOL DO YOU HAVE ON A TYPICAL DAY: 1 OR 2
HOW OFTEN DO YOU HAVE A DRINK CONTAINING ALCOHOL: MONTHLY OR LESS

## 2022-04-29 ASSESSMENT — ENCOUNTER SYMPTOMS
SORE THROAT: 0
MYALGIAS: 0
HEARTBURN: 0
EYE PAIN: 0
NERVOUS/ANXIOUS: 0
ABDOMINAL PAIN: 0
CHILLS: 0
HEMATOCHEZIA: 0
DIZZINESS: 0
JOINT SWELLING: 0
PARESTHESIAS: 0
SHORTNESS OF BREATH: 0
NAUSEA: 0
DYSURIA: 0
COUGH: 0
FEVER: 0
PALPITATIONS: 0
HEMATURIA: 0
ARTHRALGIAS: 0
DIARRHEA: 0
HEADACHES: 0
FREQUENCY: 0
CONSTIPATION: 0
WEAKNESS: 0

## 2022-04-29 ASSESSMENT — SOCIAL DETERMINANTS OF HEALTH (SDOH)
IN A TYPICAL WEEK, HOW MANY TIMES DO YOU TALK ON THE PHONE WITH FAMILY, FRIENDS, OR NEIGHBORS?: MORE THAN THREE TIMES A WEEK
HOW OFTEN DO YOU GET TOGETHER WITH FRIENDS OR RELATIVES?: MORE THAN THREE TIMES A WEEK
DO YOU BELONG TO ANY CLUBS OR ORGANIZATIONS SUCH AS CHURCH GROUPS UNIONS, FRATERNAL OR ATHLETIC GROUPS, OR SCHOOL GROUPS?: NO
HOW OFTEN DO YOU ATTEND CHURCH OR RELIGIOUS SERVICES?: NEVER

## 2022-04-29 NOTE — PROGRESS NOTES
SUBJECTIVE:   CC: Anival Galvez is an 41 year old male who presents for preventative health visit.     Patient has been advised of split billing requirements and indicates understanding: Yes     Healthy Habits:     Getting at least 3 servings of Calcium per day:  NO    Bi-annual eye exam:  Yes    Dental care twice a year:  Yes    Sleep apnea or symptoms of sleep apnea:  Sleep apnea    Diet:  Regular (no restrictions)    Frequency of exercise:  1 day/week    Duration of exercise:  Less than 15 minutes    Taking medications regularly:  Yes    Medication side effects:  None    PHQ-2 Total Score: 0    Additional concerns today:  No    Today's PHQ-2 Score:   PHQ-2 ( 1999 [employer]) 4/29/2022   Q1: Little interest or pleasure in doing things 0   Q2: Feeling down, depressed or hopeless 0   PHQ-2 Score 0   PHQ-2 Total Score (12-17 Years)- Positive if 3 or more points; Administer PHQ-A if positive -   Q1: Little interest or pleasure in doing things Not at all   Q2: Feeling down, depressed or hopeless Not at all   PHQ-2 Score 0     Abuse: Current or Past(Physical, Sexual or Emotional)- No  Do you feel safe in your environment? Yes    Social History     Tobacco Use     Smoking status: Former Smoker     Packs/day: 1.00     Years: 5.00     Pack years: 5.00     Types: Cigarettes     Smokeless tobacco: Never Used   Substance Use Topics     Alcohol use: Yes     Comment: Seldom     Alcohol Use 4/29/2022   Prescreen: >3 drinks/day or >7 drinks/week? No   Prescreen: >3 drinks/day or >7 drinks/week? -     Last PSA: No results found for: PSA    Reviewed orders with patient. Reviewed health maintenance and updated orders accordingly - Yes  BP Readings from Last 3 Encounters:   04/29/22 121/81   05/11/21 106/78   09/10/20 118/79    Wt Readings from Last 3 Encounters:   04/29/22 89 kg (196 lb 3.2 oz)   06/25/21 88.5 kg (195 lb)   05/11/21 90.3 kg (199 lb)                  Patient Active Problem List   Diagnosis     Seasonal allergic  rhinitis     Osgood-Schlatter's disease     Globus sensation     Cataract of left eye, unspecified cataract type     RAFAEL (obstructive sleep apnea)     Past Surgical History:   Procedure Laterality Date     EYE SURGERY  9/22/2020    Cataract removal left eye     wisdom teeth         Social History     Tobacco Use     Smoking status: Former Smoker     Packs/day: 1.00     Years: 5.00     Pack years: 5.00     Types: Cigarettes     Smokeless tobacco: Never Used   Substance Use Topics     Alcohol use: Yes     Comment: Seldom     Family History   Problem Relation Age of Onset     Family History Negative Mother      Family History Negative Father          No Known Allergies    Reviewed and updated as needed this visit by clinical staff   Tobacco  Allergies    Med Hx  Surg Hx  Fam Hx  Soc Hx        Reviewed and updated as needed this visit by Provider                   Past Medical History:   Diagnosis Date     CARDIOVASCULAR SCREENING; LDL GOAL LESS THAN 160 5/12/2017     Globus sensation 5/12/2017     Goiter 5/12/2017     Osgood-Schlatter's disease 3/24/2016     Seasonal allergic rhinitis 3/24/2016      Past Surgical History:   Procedure Laterality Date     EYE SURGERY  9/22/2020    Cataract removal left eye     wisdom teeth         Review of Systems   Constitutional: Negative for chills and fever.   HENT: Negative for congestion, ear pain, hearing loss and sore throat.    Eyes: Negative for pain and visual disturbance.   Respiratory: Negative for cough and shortness of breath.    Cardiovascular: Negative for chest pain, palpitations and peripheral edema.   Gastrointestinal: Negative for abdominal pain, constipation, diarrhea, heartburn, hematochezia and nausea.   Genitourinary: Negative for dysuria, frequency, genital sores, hematuria, impotence, penile discharge and urgency.   Musculoskeletal: Negative for arthralgias, joint swelling and myalgias.   Skin: Negative for rash.   Neurological: Negative for dizziness,  weakness, headaches and paresthesias.   Psychiatric/Behavioral: Negative for mood changes. The patient is not nervous/anxious.      occasionally when he eats certain food, he feels that he has to burp often after his meals.    OBJECTIVE:   There were no vitals taken for this visit.    Physical Exam  GENERAL: healthy, alert and no distress  EYES: Eyes grossly normal to inspection, PERRL and conjunctivae and sclerae normal  HENT: ear canals and TM's normal, nose and mouth without ulcers or lesions  NECK: no adenopathy, no asymmetry, masses, or scars and thyroid normal to palpation  RESP: lungs clear to auscultation - no rales, rhonchi or wheezes  CV: regular rate and rhythm, normal S1 S2, no S3 or S4, no murmur, click or rub, no peripheral edema and peripheral pulses strong  ABDOMEN: soft, nontender, no hepatosplenomegaly, no masses and bowel sounds normal  MS: no gross musculoskeletal defects noted, no edema  SKIN: no suspicious lesions or rashes  NEURO: Normal strength and tone, mentation intact and speech normal  PSYCH: mentation appears normal, affect normal/bright        ASSESSMENT/PLAN:   (Z00.00) Routine history and physical examination of adult  (primary encounter diagnosis)  Comment: check below labs, (*to be done in the future fasting), meanwhile, Today I counseled the patient about diet, regular exercise and weight loss planning.  Plan: Glucose, Hemoglobin A1c, Lipid panel reflex to         direct LDL Fasting            (G47.33) RAFAEL (obstructive sleep apnea)  Comment: pt was diagnosed with RAFAEL  Plan: to start on CPAP soon.    (L74.513) Hyperhidrosis of feet  Comment:   Plan: aluminum chloride (DRYSOL) 20 % external         solution            (L74.512) Hyperhidrosis of hands  Comment:   Plan: aluminum chloride (DRYSOL) 20 % external         solution            (J30.2) Seasonal allergic rhinitis, unspecified trigger  Comment: controlled, continue on   Plan: fluticasone (FLONASE) 50 MCG/ACT nasal spray         "          COUNSELING:   Reviewed preventive health counseling, as reflected in patient instructions       Regular exercise       Healthy diet/nutrition    Estimated body mass index is 29.66 kg/m  as calculated from the following:    Height as of 6/25/21: 1.727 m (5' 7.99\").    Weight as of 6/25/21: 88.5 kg (195 lb).     Weight management plan: Discussed healthy diet and exercise guidelines    He reports that he has quit smoking. His smoking use included cigarettes. He has a 5.00 pack-year smoking history. He has never used smokeless tobacco.      Counseling Resources:  ATP IV Guidelines  Pooled Cohorts Equation Calculator  FRAX Risk Assessment  ICSI Preventive Guidelines  Dietary Guidelines for Americans, 2010  USDA's MyPlate  ASA Prophylaxis  Lung CA Screening    Alexsander Funes MD  [facility]  "

## 2022-05-24 ENCOUNTER — LAB (OUTPATIENT)
Dept: LAB | Facility: CLINIC | Age: 42
End: 2022-05-24
Payer: COMMERCIAL

## 2022-05-24 DIAGNOSIS — Z00.00 ROUTINE HISTORY AND PHYSICAL EXAMINATION OF ADULT: ICD-10-CM

## 2022-05-24 LAB
CHOLEST SERPL-MCNC: 205 MG/DL
FASTING STATUS PATIENT QL REPORTED: ABNORMAL
FASTING STATUS PATIENT QL REPORTED: ABNORMAL
GLUCOSE BLD-MCNC: 103 MG/DL (ref 70–99)
HBA1C MFR BLD: 5.9 % (ref 0–5.6)
HDLC SERPL-MCNC: 46 MG/DL
LDLC SERPL CALC-MCNC: 108 MG/DL
NONHDLC SERPL-MCNC: 159 MG/DL
TRIGL SERPL-MCNC: 256 MG/DL

## 2022-05-24 PROCEDURE — 36415 COLL VENOUS BLD VENIPUNCTURE: CPT

## 2022-05-24 PROCEDURE — 80061 LIPID PANEL: CPT

## 2022-05-24 PROCEDURE — 83036 HEMOGLOBIN GLYCOSYLATED A1C: CPT

## 2022-05-24 PROCEDURE — 82947 ASSAY GLUCOSE BLOOD QUANT: CPT

## 2022-09-15 ENCOUNTER — DOCUMENTATION ONLY (OUTPATIENT)
Dept: SLEEP MEDICINE | Facility: CLINIC | Age: 42
End: 2022-09-15

## 2022-09-15 DIAGNOSIS — G47.33 OBSTRUCTIVE SLEEP APNEA (ADULT) (PEDIATRIC): Primary | ICD-10-CM

## 2022-09-15 NOTE — PROGRESS NOTES
Patient was offered choice of vendor and chose [facility].  Patient Anival Galvez was set up at [facility] on September 15, 2022. Patient received a Resmed Airsense 11 Pressures were set at 5-15 cm H2O.   Patient s ramp is 5 cm H2O for Auto and FLEX/EPR is EPR, 2.  Patient received a Resmed Mask name: AIRFIT N20  Nasal mask size Large, heated tubing and heated humidifier.  Patient does need to meet compliance. Patient has a follow up on 12/2/2022 with Latisha Avendano PA-C .  NOTE:  Patient was previously seen by Dr Stone, who is no longer here.  He selected Latisha Avendano for his follow-up.  Juli Davey

## 2022-09-19 ENCOUNTER — DOCUMENTATION ONLY (OUTPATIENT)
Dept: SLEEP MEDICINE | Facility: CLINIC | Age: 42
End: 2022-09-19

## 2022-09-19 NOTE — PROGRESS NOTES
3 day Sleep therapy management telephone visit    Diagnostic AHI:  HST 7.2    Confirmed with patient at time of call- Yes Patient is still interested in STM service       Subjective measures:  Patient has some nasal congestion.  He stated things going pretty well with CPAP.       Objective data     Order Settings for PAP  CPAP min 5    CPAP max 15             Device settings from machine CPAP min 5.0     CPAP max 15.0           EPR Setting TWO    RESMED soft response  OFF     Assessment: Nighty usage most nights over four hours      Action plan: Patient to have 14 day STM visit. Patient has a follow up visit scheduled:   yes within 31-90 days of set up    Replacement device: No  STM ordered by provider: Yes     Total time spent on accessing and  interpreting remote patient PAP therapy data  10 minutes    Total time spent counseling, coaching  and reviewing PAP therapy data with patient  4 minutes    [id number] no

## 2022-09-27 ENCOUNTER — DOCUMENTATION ONLY (OUTPATIENT)
Dept: SLEEP MEDICINE | Facility: CLINIC | Age: 42
End: 2022-09-27

## 2022-09-27 NOTE — PROGRESS NOTES
STM Recheck:  Patient looking to change his large cushion for a medium cushion on his mask.  He will call [facility].     Modified Advancement Flap Text: The defect edges were debeveled with a #15 scalpel blade.  Given the location of the defect, shape of the defect and the proximity to free margins a modified advancement flap was deemed most appropriate.  Using a sterile surgical marker, an appropriate advancement flap was drawn incorporating the defect and placing the expected incisions within the relaxed skin tension lines where possible.    The area thus outlined was incised deep to adipose tissue with a #15 scalpel blade.  The skin margins were undermined to an appropriate distance in all directions utilizing iris scissors.

## 2022-09-30 ENCOUNTER — DOCUMENTATION ONLY (OUTPATIENT)
Dept: SLEEP MEDICINE | Facility: CLINIC | Age: 42
End: 2022-09-30

## 2022-09-30 NOTE — PROGRESS NOTES
14  DAY STM VISIT    Diagnostic AHI:  7.2  HST    Data only recheck     Assessment: Pt meeting objective benchmarks.       Action plan: pt to have 30 day STM visit.      Device type: Auto-CPAP    PAP settings: CPAP min 5.0 cm  H20       CPAP max 15.0 cm  H20      95th% pressure 10.2 cm  H20        RESMED EPR level Setting: TWO    RESMED Soft response setting:  OFF    Mask type:  Nasal Mask    Objective measures: 14 day rolling measures      Compliance  78 %      Leak  8.16  lpm  last  upload      AHI 0.46   last  upload      Average number of minutes 376      Objective measure goal  Compliance   Goal >70%  Leak   Goal < 24 lpm  AHI  Goal < 5  Usage  Goal >240        Total time spent on accessing and interpreting remote patient PAP therapy data  10 minutes    Total time spent counseling, coaching  and reviewing PAP therapy data with patient  0 minutes    [id number]  [id number]  no (3 day STM)

## 2022-10-19 ENCOUNTER — DOCUMENTATION ONLY (OUTPATIENT)
Dept: SLEEP MEDICINE | Facility: CLINIC | Age: 42
End: 2022-10-19
Payer: COMMERCIAL

## 2022-10-20 NOTE — PROGRESS NOTES
30 DAY STM VISIT    Diagnostic AHI:  7.2 HST    PAP settings:  CPAP MIN CPAP MAX 95TH % PRESSURE EPR RESMED SOFT RESPONSE SETTING   5.0 cm  H20 15.0 cm  H20 10 cm  H20  TWO OFF     Device type: APAP   Mask type:  Nasal Mask    Objective measures: 14 day rolling measures:    COMPLIANCE LEAK AHI AVERAGE USE IN MINUTES   78 % 9.14 0.32 321   GOAL >70% GOAL < 24 LPM GOAL <5 GOAL >240        Assessment: Pt meeting objective benchmarks.  Patient meeting subjective benchmarks.   Subjective measures:   Patient reports CPAP is going well. He wants a different sized cushion. [facility] We adjusted tube temp for rainout.   Action plan: pt to have 6 month STM visit  Patient has scheduled a follow up visit with Latisha Avendano PA-C  on 12/02/2022.     Total time spent on accessing and interpreting remote patient PAP therapy data  10 minutes    Total time spent counseling, coaching  and reviewing PAP therapy data with patient  4 minutes     [id number] this call  [id number] no  at 3 or 14 day STM

## 2022-11-25 ASSESSMENT — SLEEP AND FATIGUE QUESTIONNAIRES
HOW LIKELY ARE YOU TO NOD OFF OR FALL ASLEEP IN A CAR, WHILE STOPPED FOR A FEW MINUTES IN TRAFFIC: WOULD NEVER DOZE
HOW LIKELY ARE YOU TO NOD OFF OR FALL ASLEEP WHILE SITTING AND TALKING TO SOMEONE: WOULD NEVER DOZE
HOW LIKELY ARE YOU TO NOD OFF OR FALL ASLEEP WHILE SITTING QUIETLY AFTER LUNCH WITHOUT ALCOHOL: WOULD NEVER DOZE
HOW LIKELY ARE YOU TO NOD OFF OR FALL ASLEEP WHILE WATCHING TV: WOULD NEVER DOZE
HOW LIKELY ARE YOU TO NOD OFF OR FALL ASLEEP WHILE SITTING INACTIVE IN A PUBLIC PLACE: WOULD NEVER DOZE
HOW LIKELY ARE YOU TO NOD OFF OR FALL ASLEEP WHEN YOU ARE A PASSENGER IN A CAR FOR AN HOUR WITHOUT A BREAK: WOULD NEVER DOZE
HOW LIKELY ARE YOU TO NOD OFF OR FALL ASLEEP WHILE SITTING AND READING: WOULD NEVER DOZE
HOW LIKELY ARE YOU TO NOD OFF OR FALL ASLEEP WHILE LYING DOWN TO REST IN THE AFTERNOON WHEN CIRCUMSTANCES PERMIT: WOULD NEVER DOZE

## 2022-12-02 ENCOUNTER — VIRTUAL VISIT (OUTPATIENT)
Dept: SLEEP MEDICINE | Facility: CLINIC | Age: 42
End: 2022-12-02
Payer: COMMERCIAL

## 2022-12-02 VITALS — BODY MASS INDEX: 28.79 KG/M2 | WEIGHT: 190 LBS | HEIGHT: 68 IN

## 2022-12-02 DIAGNOSIS — G47.33 OSA (OBSTRUCTIVE SLEEP APNEA): Primary | ICD-10-CM

## 2022-12-02 PROCEDURE — 99214 OFFICE O/P EST MOD 30 MIN: CPT | Mod: 95 | Performed by: PHYSICIAN ASSISTANT

## 2022-12-02 ASSESSMENT — PAIN SCALES - GENERAL: PAINLEVEL: NO PAIN (0)

## 2022-12-02 NOTE — PROGRESS NOTES
Anival is a 42 year old who is being evaluated via a billable video visit.      How would you like to obtain your AVS? MyChart  If the video visit is dropped, the invitation should be resent by: Send to e-mail at: [email or web address]  Will anyone else be joining your video visit? No        Janice Camargo      Video-Visit Details    Video Start Time: 10:35AM    Type of service:  Video Visit    Video End Time:10:53AM    Originating Location (pt. Location): Home        Distant Location (provider location):  Off-site    Platform used for Video Visit: [facility]   Outpatient Sleep Medicine  Dec 2, 2022       Name: Anival Galvez MRN# [id number]   Age: 42 year old YOB: 1980            Assessment and Plan:   1. RAFAEL (obstructive sleep apnea)  Patient's sleep apnea is adequately managed and well treated with current PAP settings 5-76kqH2W with low residual AHI of 0.2 events per hour. Compliance is good, meeting all compliance goals.  Tolerating PAP well. Daytime symptoms and nighttime sleep quality are improved. No indication to adjust settings today. Prescription renewed for mask/supplies.   - Comprehensive DME     Anival Galvez will follow up in about 1-2 years for routine CPAP check in, or sooner as needed.        Chief Complaint      Chief Complaint   Patient presents with     Video Visit     Follow up             History of Present Illness:     Anival Galvez is a 42 year old male who presents to the clinic for follow-up of their mild obstructive sleep apnea treated with CPAP.     Patient previously seen by Dr. Stone with last visit 9/20/2021.     HST 9/8/2021 (195#) with AHI 7.2 and nikki oxygen nikki 84%.     Patient set up with auto CPAP 5-15 cm H2O [facility] [place]  Returns to clinic today for insurance required compliance visit.     Overall, the patient rates their experience with PAP as 9 (0 poor, 10 great). Patient is using a nasal mask. The  "mask is comfortable. The mask is not leaking. They are not snoring with the mask on. They are not having gasp arousals.  They are not having significant oral/nasal dryness or epistaxis.  They are not having pain/skin breakdown. The pressure settings are comfortable.     Bedtime is typically 12:00AM. Usually it takes about 10-30 minutes to fall asleep with the mask on. Wake time is typically 7:30AM.  Patient is using PAP therapy 6.5 hours per night. The patient is usually getting 7 hours of sleep per night.    ResMed Auto-PAP 5.0 - 15.0 cmH2O 30 day usage data:    76% of days with > 4 hours of use. 6/30 days with no use.   Average use 304 minutes per day.   95%ile Leak 8.69 L/min.   CPAP 95% pressure 10.7 cm.   AHI 0.2 events per hour.     SCALES:   INSOMNIA: Insomnia Severity Score: 1   SLEEPINESS: Mayetta Sleepiness Score: 0    Past medical/surgical history, family history, social history, medications and allergies were reviewed.           Physical Examination:   Ht 1.727 m (5' 8\")   Wt 86.2 kg (190 lb)   BMI 28.89 kg/m    General appearance: Awake, alert, cooperative. Well groomed. Sitting comfortably in chair. In no apparent distress.  HEENT: Head: Normocephalic, atraumatic. Eyes:Conjunctiva clear. Sclera normal. Nose: External appearance without deformity.   Pulmonary:  Able to speak easily in full sentences. No cough or wheeze.   Skin:  No rashes or significant lesions on visible skin.   Neurologic: Alert, oriented x3.   Psychiatric: Mood euthymic. Affect congruent with full range and intensity.      CC:  Alexsander Funes PA-C  Dec 2, 2022     [facility]  [address] , [place] [address]     [facility]  [address] [place] [id number]    Chart documentation was completed, in part, with [employer] voice-recognition software. Even though reviewed, some grammatical, spelling, and word errors may remain.    30 minutes spent on " day of encounter doing chart review, history and exam, documentation, and further activities as noted above

## 2023-06-02 ENCOUNTER — HEALTH MAINTENANCE LETTER (OUTPATIENT)
Age: 43
End: 2023-06-02

## 2023-06-16 ENCOUNTER — OFFICE VISIT (OUTPATIENT)
Dept: PODIATRY | Facility: CLINIC | Age: 43
End: 2023-06-16
Payer: COMMERCIAL

## 2023-06-16 VITALS
HEART RATE: 74 BPM | SYSTOLIC BLOOD PRESSURE: 142 MMHG | DIASTOLIC BLOOD PRESSURE: 92 MMHG | WEIGHT: 190 LBS | BODY MASS INDEX: 28.79 KG/M2 | OXYGEN SATURATION: 100 % | HEIGHT: 68 IN

## 2023-06-16 DIAGNOSIS — M79.672 PAIN OF LEFT HEEL: ICD-10-CM

## 2023-06-16 DIAGNOSIS — M72.2 PLANTAR FASCIITIS, LEFT: Primary | ICD-10-CM

## 2023-06-16 PROCEDURE — 99203 OFFICE O/P NEW LOW 30 MIN: CPT | Performed by: PODIATRIST

## 2023-06-16 NOTE — PROGRESS NOTES
"ASSESSMENT:  Encounter Diagnoses   Name Primary?     Plantar fasciitis, left Yes     Pain of left heel      MEDICAL DECISION MAKING:  Anival's pain is most consistent with plantar fasciitis, possibly some plantar bursitis, left heel.    We discussed the causes and nature of arch and heel pain.  The anatomy and function of the plantar fascia was discussed.  The treatment plan discussed included calf and plantar fascial stretching, avoidance of barefoot walking, stiffer soled shoes, activity modification, over-the-counter arch supports versus custom orthoses, prn icing and NSAIDs.  A comprehensive After-Visit Summary was provided.     He is referred for custom molded multidensity orthoses.    Anival also mentioned having hyperhidrosis and a current bout of tinea pedis.  This was noted in the webspaces bilaterally.  Recommend he try an antifungal spray rather than the Lotrimin cream.  Explained that keeping the webspaces dry might be more successful.  If this problem persists, referral to dermatology is appropriate    Disclaimer: This note consists of symbols derived from keyboarding, dictation and/or voice recognition software. As a result, there may be errors in the script that have gone undetected. Please consider this when interpreting information found in this chart.    Jeremy Cruz, RENY, FACFAS, MS    [place] Department of Podiatry/Foot & Ankle Surgery      ____________________________________________________________________    HPI:       Anival presents reporting persistent pain in the left heel.  He has dealt with this for 4 to 5 months.  \"Aching pain of varying degrees.\"  Pain rating a 5 out of 10  Pain daily  Previous treatment, ice, massage, over-the-counter arch support  Pain is worse with prolonged weightbearing activities  *  Past Medical History:   Diagnosis Date     CARDIOVASCULAR SCREENING; LDL GOAL LESS THAN 160 5/12/2017     Globus sensation 5/12/2017     Goiter 5/12/2017     Osgood-Schlatter's disease " "3/24/2016     Seasonal allergic rhinitis 3/24/2016   *  *  Past Surgical History:   Procedure Laterality Date     EYE SURGERY  9/22/2020    Cataract removal left eye     wisdom teeth     *  *  Current Outpatient Medications   Medication Sig Dispense Refill     aluminum chloride (DRYSOL) 20 % external solution Apply topically At Bedtime Apply on the hands and soles at night time, then wash in the morning, continue daily until the sweating decrease, then you can switch to once or twice a week. 120 mL 3     fluticasone (FLONASE) 50 MCG/ACT nasal spray Spray 2 sprays into both nostrils daily 48 mL 3         EXAM:    Vitals: BP (!) 142/92   Pulse 74   Ht 1.727 m (5' 8\")   Wt 86.2 kg (190 lb)   SpO2 100%   BMI 28.89 kg/m    BMI: Body mass index is 28.89 kg/m .    Constitutional:  Anival Galvez is in no apparent distress, appears well-nourished.  Cooperative with history and physical exam.    Vascular:  Pedal pulses are palpable for both the DP and PT arteries.  CFT < 3 sec.  No edema.      Neuro: Light touch sensation is intact to the L4, L5, S1 distributions  No evidence of weakness, spasticity, or contracture in the lower extremities.     Derm: Normal texture and turgor.  No erythema, ecchymosis, or cyanosis.  No open lesions.   Skin peeling and inflammation in bilateral webspaces    Musculoskeletal:    Lower extremity muscle strength is normal. No gross deformities.  Pain on palpation to the plantar aspect of the left heel.  No pain with squeezing the calcaneal body.        "

## 2023-06-16 NOTE — PATIENT INSTRUCTIONS
Thank you for choosing [facility] Podiatry / Foot & Ankle Surgery!    DR. PIERCE'S CLINIC LOCATIONS:     [facility] TRIAGE LINE: [phone]   [address] [place] [address] APPOINTMENTS: [phone]   [place] [address] RADIOLOGY: [phone]   (Every other Tues - Wed - Fri PM) SET UP SURGERY: [phone]    PHYSICAL THERAPY: [phone]   [place] SPECIALTY BILLING QUESTIONS: [phone] [address] FAX: [phone]   [place] [address]    (Thurs & Fri AM)        Dr. Pierce's Heel and Arch Pain Recommendations:     1)  A rigid-soled, athletic shoe will take stress off of the plantar fascia.    2)  Some good over the counter inserts/ arch supports might help:  Spenco, Superfeet, Birken[employer], others.  If you buy some, consider gradually getting used to them. Increase time on them over the course of a week.    3)  Custom orthoses (prescription arch supports) are known to help treat and prevent plantar fasciitis.    4)  Avoid barefoot walking, even at home.  It is a good idea to wear supportive shoes as much as possible.    5)  Stretch your calf musculature and plantar fascia frequently.  It is a good idea to do this before getting out of bed, if pain is worse in the mornings.    6)  Ice and anti-inflammatories can help if pain is related to inflammation - more likely to help when the problem first starts.  If the pain has existed for over a month, anti-inflammatory measures might be less helpful.  Sometimes he can be therapeutic.    If your pain persists, please return to clinic.  Future options include bracing, physical therapy, immobilization/walking boot, surgery or other procedures.    Plantar fasciitis is very common and given time, will usually resolve. Obviously it can take a long time, as we walk on the part that his hurting.        PLANTAR FASCIITIS  Plantar fasciitis is often referred to as heel spurs or heel pain. Plantar fasciitis is a very common problem that affects people of  all foot shapes, age, weight and activity level. Pain may be in the arch or on the weight-bearing surface of the heel. The pain may come on without injury or identifiable cause. Pain is generally present when first getting out of bed in the morning or up from a seated break.     CAUSES  The plantar fascia is a dense fibrous band of tissue that stretches across the bottom surface of the foot. The fascia helps support the foot muscles and arch. Plantar fasciitis is thought to be caused by mechanical strain or overload. Frequent walking without shoes or wearing unsupportive shoes is thought to cause structural overload and ultimately inflammation of the plantar fascia. Some people have heel spurs that can be seen on x-ray. The heel spur is actually a minor component of plantar fascitis and is largely ignored.       SELF TREATMENT   The easiest solution is to stop walking around your home without shoes. Plantar fasciitis is largely a shoe problem. Shoes are either not being worn often enough or your current shoes are inadequate for your weight, foot structure or activity level. The majority of shoes on the market today are not sufficient to resist development of plantar fasciitis or to promote healing. Assume that your current shoes are inadequate and will need to be replaced. Even high quality shoes wear out with 6 months to one year of frequent use. Weight loss is another option. Losing ten pounds in the next two months may be enough to resolve the problem. Ice applied to the area of pain two to three times per day for ten minutes each session can be very helpful. Warm foot soaks in epsom salts can also relieve pain. This should continue until the problem resolves. Achilles tendon stretching is essential. Stretch multiple times daily to promote healing and to prevent recurrence in the future. Over all stretching of the body is helpful as well such as the calves, thighs and lower back. Normally when one area of the  body is tight, other areas are too. Gentle Yoga can be good for this.     Over the counter topical anti inflammatories can be helpful such as biofreeze, bengay, salon pas, ect...  Oral ibuprofen or aleve is recommended as well to try to calm down inflammation.     Night splints can be helpful to gradually stretch the foot at night as a lot of pain is when you get up in the morning. Taking a towel or thera band and stretching the foot back multiple times before you get ou of bed can be beneficial as well.     MEDICAL TREATMENT  Medical treatments often include custom arch supports, cortisone injections, physical therapy, splints to be worn in bed, prescription medications and surgery. The home treatments listed above will be necessary regardless of these advanced medical treatments. Surgery is rarely needed but is very helpful in selected cases.     PROGNOSIS  Plantar fasciitis can last from one day to a lifetime. Some people get intermittent fascitis that is very short-lived. Others suffer daily for years. Excessive body weight, frequent bare foot walking, long hours on the feet, inadequate shoes, predisposing foot structures and excessive activity such as running are all potential issues that lead to chronic and/or recurring plantar fascitis. Having plantar fasciitis means that you are forever prone to this problem and will require modification of some of the above factors. Most people seek treatment within one to four months. Healing usually requires a similar one to four month time frame. Healing time is relative to the amount of effort spent treating the problem.   Plantar fasciitis is highly recurrent. Risk factors often continue, including return to bare foot walking, inadequate shoes, excessive body weight, excessive activities, etc. Your life style and foot structure may predispose you to recurrent plantar fasciitis. A daily prevention regimen can be very helpful. Ongoing use of shoe inserts, careful  attention to appropriate shoes, daily Achilles stretching, etc. may prevent recurrence. Prompt attention at the earliest warning signs of heel pain can resolve the problem in as short as a few days.     EXERCISES  Stair Exercise: Step on the stairs with the ball of your foot and hold your position for at least 15 seconds, then slowly step down with the heels of your foot. You can do this daily and as often as you want.   Picking the Towel: Sit comfortably and then pick the towel up with your toes. You can use any object other than a towel as long as the material can be soft and you can pick it up with your toes.  Rolling the Bottle: Use a small ball or frozen water bottle and then roll it around with your foot.   Flex the Toes: Sit comfortably and then flex your toes by pointing it towards the floor or towards your body. This will relax and flex your foot and exercise your plantar fascia, the calf, and the Achilles tendon. The inability of the foot to stretch often causes the bunching up of the plantar fascia area leading to the pain.  Calf/Achilles Stretching: Lay on you back and raise one foot, then point your toes towards the floor. See photo below:               Hold each stretch for 30 seconds. Stretch 10 times per set, three sets per day. Morning, afternoon and evening. If your heel pain is very severe in the morning, consider doing the first set of stretches before you get out of bed.      OVER THE COUNTER INSERT RECOMMENDATIONS  SuperFeet   Sofsole Fit Spenco   Power Step   Walk-Fit Arch Cradles     Most of these can be found at your local [employer], sporting [employer] stores, or online.  **A good high quality over the counter insert should cost around $40-$50      [email or web address] LOCATIONS  [address]  [phone]   [address]  [phone] [place] [address]  [phone]   [address]  [phone]   [address]  [phone] [place] [address]  [phone]   [place]  [address]  [phone]   [place]  [address] [place] [address] [phone] [place]  [address] [phone]     [place] ORTHOT[facility] LOCATIONS  [facility]  [address]  [place] [address]  Phone: [phone]  Fax: [phone] [facility]  [address]  [place] [address]  Phone: [phone]   Fax: [phone]   [facility]  [address]  [place] [address]  Phone: [phone]  Fax: [phone] [facility]  [address]  [place] [address]  Phone: [phone]   Fax: [phone]   [facility]   [address]  [place] [address]  Phone: [phone]  Fax: [phone] * Please call any location listed to make an appointment for a casting/fitting. Your referral was sent to their central office and they will all have the order on file.

## 2023-06-16 NOTE — LETTER
"    6/16/2023         RE: Anival Galvez  [address] [address]        Dear Colleague,    Thank you for referring your patient, Anival Galvez, to the [facility] PODIATRY. Please see a copy of my visit note below.    ASSESSMENT:  Encounter Diagnoses   Name Primary?     Plantar fasciitis, left Yes     Pain of left heel      MEDICAL DECISION MAKING:  Anival's pain is most consistent with plantar fasciitis, possibly some plantar bursitis, left heel.    We discussed the causes and nature of arch and heel pain.  The anatomy and function of the plantar fascia was discussed.  The treatment plan discussed included calf and plantar fascial stretching, avoidance of barefoot walking, stiffer soled shoes, activity modification, over-the-counter arch supports versus custom orthoses, prn icing and NSAIDs.  A comprehensive After-Visit Summary was provided.     He is referred for custom molded multidensity orthoses.    Anival also mentioned having hyperhidrosis and a current bout of tinea pedis.  This was noted in the webspaces bilaterally.  Recommend he try an antifungal spray rather than the Lotrimin cream.  Explained that keeping the webspaces dry might be more successful.  If this problem persists, referral to dermatology is appropriate    Disclaimer: This note consists of symbols derived from keyboarding, dictation and/or voice recognition software. As a result, there may be errors in the script that have gone undetected. Please consider this when interpreting information found in this chart.    Jeremy Cruz, RENY, FACFAS, [facility] Department of Podiatry/Foot & Ankle Surgery      ____________________________________________________________________    HPI:       Anival presents reporting persistent pain in the left heel.  He has dealt with this for 4 to 5 months.  \"Aching pain of varying degrees.\"  Pain rating a 5 out of 10  Pain daily  Previous treatment, ice, massage, " "over-the-counter arch support  Pain is worse with prolonged weightbearing activities  *  Past Medical History:   Diagnosis Date     CARDIOVASCULAR SCREENING; LDL GOAL LESS THAN 160 5/12/2017     Globus sensation 5/12/2017     Goiter 5/12/2017     Osgood-Schlatter's disease 3/24/2016     Seasonal allergic rhinitis 3/24/2016   *  *  Past Surgical History:   Procedure Laterality Date     EYE SURGERY  9/22/2020    Cataract removal left eye     wisdom teeth     *  *  Current Outpatient Medications   Medication Sig Dispense Refill     aluminum chloride (DRYSOL) 20 % external solution Apply topically At Bedtime Apply on the hands and soles at night time, then wash in the morning, continue daily until the sweating decrease, then you can switch to once or twice a week. 120 mL 3     fluticasone (FLONASE) 50 MCG/ACT nasal spray Spray 2 sprays into both nostrils daily 48 mL 3         EXAM:    Vitals: BP (!) 142/92   Pulse 74   Ht 1.727 m (5' 8\")   Wt 86.2 kg (190 lb)   SpO2 100%   BMI 28.89 kg/m    BMI: Body mass index is 28.89 kg/m .    Constitutional:  Anival Galvez is in no apparent distress, appears well-nourished.  Cooperative with history and physical exam.    Vascular:  Pedal pulses are palpable for both the DP and PT arteries.  CFT < 3 sec.  No edema.      Neuro: Light touch sensation is intact to the L4, L5, S1 distributions  No evidence of weakness, spasticity, or contracture in the lower extremities.     Derm: Normal texture and turgor.  No erythema, ecchymosis, or cyanosis.  No open lesions.   Skin peeling and inflammation in bilateral webspaces    Musculoskeletal:    Lower extremity muscle strength is normal. No gross deformities.  Pain on palpation to the plantar aspect of the left heel.  No pain with squeezing the calcaneal body.            Again, thank you for allowing me to participate in the care of your patient.        Sincerely,        Jeremy Cruz DPM    "

## 2023-07-05 ENCOUNTER — OFFICE VISIT (OUTPATIENT)
Dept: FAMILY MEDICINE | Facility: CLINIC | Age: 43
End: 2023-07-05
Payer: COMMERCIAL

## 2023-07-05 VITALS
DIASTOLIC BLOOD PRESSURE: 82 MMHG | SYSTOLIC BLOOD PRESSURE: 116 MMHG | HEIGHT: 68 IN | RESPIRATION RATE: 16 BRPM | BODY MASS INDEX: 29.83 KG/M2 | WEIGHT: 196.8 LBS | TEMPERATURE: 98.3 F | OXYGEN SATURATION: 97 % | HEART RATE: 82 BPM

## 2023-07-05 DIAGNOSIS — Z00.00 ROUTINE GENERAL MEDICAL EXAMINATION AT A HEALTH CARE FACILITY: Primary | ICD-10-CM

## 2023-07-05 PROCEDURE — 99396 PREV VISIT EST AGE 40-64: CPT | Performed by: FAMILY MEDICINE

## 2023-07-05 SDOH — ECONOMIC STABILITY: INCOME INSECURITY: IN THE LAST 12 MONTHS, WAS THERE A TIME WHEN YOU WERE NOT ABLE TO PAY THE MORTGAGE OR RENT ON TIME?: NO

## 2023-07-05 SDOH — ECONOMIC STABILITY: FOOD INSECURITY: WITHIN THE PAST 12 MONTHS, THE FOOD YOU BOUGHT JUST DIDN'T LAST AND YOU DIDN'T HAVE MONEY TO GET MORE.: NEVER TRUE

## 2023-07-05 SDOH — HEALTH STABILITY: PHYSICAL HEALTH: ON AVERAGE, HOW MANY DAYS PER WEEK DO YOU ENGAGE IN MODERATE TO STRENUOUS EXERCISE (LIKE A BRISK WALK)?: 1 DAY

## 2023-07-05 SDOH — ECONOMIC STABILITY: INCOME INSECURITY: HOW HARD IS IT FOR YOU TO PAY FOR THE VERY BASICS LIKE FOOD, HOUSING, MEDICAL CARE, AND HEATING?: NOT HARD AT ALL

## 2023-07-05 SDOH — HEALTH STABILITY: PHYSICAL HEALTH: ON AVERAGE, HOW MANY MINUTES DO YOU ENGAGE IN EXERCISE AT THIS LEVEL?: 10 MIN

## 2023-07-05 SDOH — ECONOMIC STABILITY: FOOD INSECURITY: WITHIN THE PAST 12 MONTHS, YOU WORRIED THAT YOUR FOOD WOULD RUN OUT BEFORE YOU GOT MONEY TO BUY MORE.: NEVER TRUE

## 2023-07-05 ASSESSMENT — ENCOUNTER SYMPTOMS
HEMATURIA: 0
DIZZINESS: 0
NERVOUS/ANXIOUS: 0
SHORTNESS OF BREATH: 0
MYALGIAS: 0
DIARRHEA: 0
ARTHRALGIAS: 0
HEARTBURN: 0
PALPITATIONS: 0
FREQUENCY: 0
DYSURIA: 0
CHILLS: 0
HEMATOCHEZIA: 0
WEAKNESS: 0
EYE PAIN: 0
JOINT SWELLING: 0
NAUSEA: 0
SORE THROAT: 0
PARESTHESIAS: 0
CONSTIPATION: 0
FEVER: 0
HEADACHES: 0
ABDOMINAL PAIN: 0
COUGH: 0

## 2023-07-05 ASSESSMENT — SOCIAL DETERMINANTS OF HEALTH (SDOH)
IN A TYPICAL WEEK, HOW MANY TIMES DO YOU TALK ON THE PHONE WITH FAMILY, FRIENDS, OR NEIGHBORS?: NEVER
DO YOU BELONG TO ANY CLUBS OR ORGANIZATIONS SUCH AS CHURCH GROUPS UNIONS, FRATERNAL OR ATHLETIC GROUPS, OR SCHOOL GROUPS?: NO
HOW OFTEN DO YOU GET TOGETHER WITH FRIENDS OR RELATIVES?: ONCE A WEEK
HOW OFTEN DO YOU ATTEND CHURCH OR RELIGIOUS SERVICES?: NEVER

## 2023-07-05 ASSESSMENT — LIFESTYLE VARIABLES
SKIP TO QUESTIONS 9-10: 1
AUDIT-C TOTAL SCORE: 1
HOW MANY STANDARD DRINKS CONTAINING ALCOHOL DO YOU HAVE ON A TYPICAL DAY: 1 OR 2
HOW OFTEN DO YOU HAVE SIX OR MORE DRINKS ON ONE OCCASION: NEVER
HOW OFTEN DO YOU HAVE A DRINK CONTAINING ALCOHOL: MONTHLY OR LESS

## 2023-07-05 NOTE — PROGRESS NOTES
SUBJECTIVE:   CC: Anival is an 42 year old who presents for preventative health visit.       7/5/2023    10:44 AM   Additional Questions   Roomed by Payal Lau   Accompanied by SUN     Healthy Habits:     Getting at least 3 servings of Calcium per day:  Yes    Bi-annual eye exam:  Yes    Dental care twice a year:  Yes    Sleep apnea or symptoms of sleep apnea:  Sleep apnea    Diet:  Regular (no restrictions)    Frequency of exercise:  1 day/week    Duration of exercise:  15-30 minutes    Taking medications regularly:  No    Barriers to taking medications:  None    Medication side effects:  None      Today's PHQ-2 Score:       7/5/2023    10:09 AM   PHQ-2 ( 1999 [employer])   Q1: Little interest or pleasure in doing things 0   Q2: Feeling down, depressed or hopeless 0   PHQ-2 Score 0   Q1: Little interest or pleasure in doing things Not at all   Q2: Feeling down, depressed or hopeless Not at all   PHQ-2 Score 0     Pt has been having plantar fasciitis and it has been preventing him from exercising.           Social History     Tobacco Use     Smoking status: Former     Packs/day: 1.00     Years: 5.00     Pack years: 5.00     Types: Cigarettes     Smokeless tobacco: Never   Substance Use Topics     Alcohol use: Yes     Comment: Seldom             7/5/2023    10:09 AM   Alcohol Use   Prescreen: >3 drinks/day or >7 drinks/week? No       Last PSA: No results found for: PSA    Reviewed orders with patient. Reviewed health maintenance and updated orders accordingly - Yes  BP Readings from Last 3 Encounters:   07/05/23 116/82   06/16/23 (!) 142/92   04/29/22 121/81    Wt Readings from Last 3 Encounters:   07/05/23 89.3 kg (196 lb 12.8 oz)   06/16/23 86.2 kg (190 lb)   12/02/22 86.2 kg (190 lb)                  Patient Active Problem List   Diagnosis     Seasonal allergic rhinitis     Osgood-Schlatter's disease     Globus sensation     Cataract of left eye, unspecified cataract type     RAFAEL (obstructive sleep apnea)     Past  Surgical History:   Procedure Laterality Date     EYE SURGERY  9/22/2020    Cataract removal left eye     wisdom teeth         Social History     Tobacco Use     Smoking status: Former     Packs/day: 1.00     Years: 5.00     Pack years: 5.00     Types: Cigarettes     Smokeless tobacco: Never   Substance Use Topics     Alcohol use: Yes     Comment: Seldom     Family History   Problem Relation Age of Onset     Family History Negative Mother      Family History Negative Father            Reviewed and updated as needed this visit by clinical staff   Tobacco  Allergies  Meds              Reviewed and updated as needed this visit by Provider                 Past Medical History:   Diagnosis Date     CARDIOVASCULAR SCREENING; LDL GOAL LESS THAN 160 5/12/2017     Globus sensation 5/12/2017     Goiter 5/12/2017     Osgood-Schlatter's disease 3/24/2016     Seasonal allergic rhinitis 3/24/2016      Past Surgical History:   Procedure Laterality Date     EYE SURGERY  9/22/2020    Cataract removal left eye     wisdom teeth         Review of Systems   Constitutional: Negative for chills and fever.   HENT: Negative for congestion, ear pain, hearing loss and sore throat.    Eyes: Negative for pain and visual disturbance.   Respiratory: Negative for cough and shortness of breath.    Cardiovascular: Negative for chest pain, palpitations and peripheral edema.   Gastrointestinal: Negative for abdominal pain, constipation, diarrhea, heartburn, hematochezia and nausea.   Genitourinary: Negative for dysuria, frequency, genital sores, hematuria, impotence, penile discharge and urgency.   Musculoskeletal: Negative for arthralgias, joint swelling and myalgias.   Skin: Negative for rash.   Neurological: Negative for dizziness, weakness, headaches and paresthesias.   Psychiatric/Behavioral: Negative for mood changes. The patient is not nervous/anxious.          OBJECTIVE:   /82 (BP Location: Right arm, Patient Position: Sitting, Cuff  "Size: Adult Large)   Pulse 82   Temp 98.3  F (36.8  C) (Oral)   Resp 16   Ht 1.727 m (5' 8\")   Wt 89.3 kg (196 lb 12.8 oz)   SpO2 97%   BMI 29.92 kg/m      Physical Exam  GENERAL: healthy, alert and no distress  EYES: Eyes grossly normal to inspection, PERRL and conjunctivae and sclerae normal  HENT: ear canals and TM's normal, nose and mouth without ulcers or lesions  NECK: no adenopathy, no asymmetry, masses, or scars and thyroid normal to palpation  RESP: lungs clear to auscultation - no rales, rhonchi or wheezes  CV: regular rate and rhythm, normal S1 S2, no S3 or S4, no murmur, click or rub, no peripheral edema and peripheral pulses strong  ABDOMEN: soft, nontender, no hepatosplenomegaly, no masses and bowel sounds normal  MS: no gross musculoskeletal defects noted, no edema  SKIN: no suspicious lesions or rashes  NEURO: Normal strength and tone, mentation intact and speech normal  PSYCH: mentation appears normal, affect normal/bright        ASSESSMENT/PLAN:   (Z00.00) Routine general medical examination at a health care facility  (primary encounter diagnosis)  Comment: discussed the importance of using CPAP machine regularly.   Plan: Lipid panel reflex to direct LDL Fasting,         Glucose, Hemoglobin A1c                    COUNSELING:   Reviewed preventive health counseling, as reflected in patient instructions       Regular exercise       Healthy diet/nutrition      BMI:   Estimated body mass index is 29.92 kg/m  as calculated from the following:    Height as of this encounter: 1.727 m (5' 8\").    Weight as of this encounter: 89.3 kg (196 lb 12.8 oz).   Weight management plan: Discussed healthy diet and exercise guidelines      He reports that he has quit smoking. His smoking use included cigarettes. He has a 5.00 pack-year smoking history. He has never used smokeless tobacco.        Alexsander Funes MD  [facility]"

## 2023-08-01 ENCOUNTER — OFFICE VISIT (OUTPATIENT)
Dept: URGENT CARE | Facility: URGENT CARE | Age: 43
End: 2023-08-01
Payer: COMMERCIAL

## 2023-08-01 ENCOUNTER — ANCILLARY PROCEDURE (OUTPATIENT)
Dept: GENERAL RADIOLOGY | Facility: CLINIC | Age: 43
End: 2023-08-01
Attending: PHYSICIAN ASSISTANT
Payer: COMMERCIAL

## 2023-08-01 VITALS
OXYGEN SATURATION: 98 % | DIASTOLIC BLOOD PRESSURE: 78 MMHG | SYSTOLIC BLOOD PRESSURE: 138 MMHG | RESPIRATION RATE: 20 BRPM | HEART RATE: 80 BPM | TEMPERATURE: 98 F

## 2023-08-01 DIAGNOSIS — S49.91XA INJURY OF RIGHT SHOULDER, INITIAL ENCOUNTER: ICD-10-CM

## 2023-08-01 DIAGNOSIS — M25.511 ACUTE PAIN OF RIGHT SHOULDER: Primary | ICD-10-CM

## 2023-08-01 DIAGNOSIS — M25.511 ACUTE PAIN OF RIGHT SHOULDER: ICD-10-CM

## 2023-08-01 DIAGNOSIS — M25.611 DECREASED RANGE OF MOTION OF RIGHT SHOULDER: ICD-10-CM

## 2023-08-01 PROCEDURE — 99213 OFFICE O/P EST LOW 20 MIN: CPT | Performed by: PHYSICIAN ASSISTANT

## 2023-08-01 PROCEDURE — 73030 X-RAY EXAM OF SHOULDER: CPT | Mod: TC | Performed by: RADIOLOGY

## 2023-08-01 NOTE — PROGRESS NOTES
"  ASSESSMENT/PLAN:      (M25.010) Acute pain of right shoulder  (primary encounter diagnosis)    MDM: Pain and decreased range of motion right shoulder after what sounds like an abduction mechanism of injury during a slip/fall yesterday at a \"splash pad\" with his child.  Limited range of motion on exam today.  I did not pursue full extent of range of motion due to patient's tenderness by intention today.  X-rays are negative for fracture or dislocation.  I discussed with patient I do have suspicion for potential soft tissue injury.  His mechanism of injury is suspicious for potential rotator cuff tear.   I have advised he follow-up with an orthopedic doctor if symptoms are not improving over the next 1 week, or sooner if symptoms are worsening.  I did provide him with an orthopedic referral today.    Please also see below patient discharge summary/plan (which I reviewed with him personally and provided in printed form for home review).     Plan: XR Shoulder Right G/E 3 Views, Orthopedic          Referral      August 1, 2023 [facility] Plan:       -Ice 20 min, every 2-3 hours for the next several days     - Ibuprofen 600 mg three times daily , with meals, as tolerated for the next 3 to 5 days.      -Use the sling provided when active for comfort support as needed. As we discussed, I would advise you remove the sling frequently to work on very gentle range of motion ( as tolerated) in attempt to avoid frozen shoulder.     -We discussed possibility of rotator cuff tear or other non-bony musculoskeletal injuries that we cannot see on x-ray.     - I advise you to follow-up with an orthopedic doctor in the next 1-2 weeks, sooner if worsening    -Follow-up immediately if you develop any  if any increased pain, redness, bruising or swelling.     - Follow-up immediately in the emergency room if you develop any numbness and tingling of arm, forearm, hand or fingers.     (M25.861) Decreased range of motion of " "right shoulder    Plan: Orthopedic  Referral      (S49.91XA) Injury of right shoulder, initial encounter    Plan: Orthopedic  Referral        This progress note has been dictated, with use of voice recognition software. Any grammatical, typographical, or context errors are unintentional and inherent to use of voice recognition software.    ---------------------            SUBJECTIVE:  Anival Galvez is a left-hand-dominant 42-year-old male who presents to urgent care today for evaluation of acute onset right-sided shoulder pain, and decreased range of motion right shoulder after a slip/fall injury yesterday.    HPI: Patient states he was at a \"splash pad\" with his child yesterday when he slipped and fell.  He describes what sounds like a sudden forced shoulder abduction mechanism of injury.  He reports pain and decreased range of motion since the above injury.      Patient denies any LOC. Denies any mental status changes or headache. Denies any head or neck sxs. Denies any pain elsewhere.     Mechanism of injury: As per above.   Pain: Pain in neutral/resting position --3-4/10. Pain with range of motion in any direction/moving shoulder 7-8/10 Patient has had decreased use of arm since injury due to pain brought on with movement. No deformity was noted by the patient.   Symptoms have been unchanged since that time.   Prior history of related problems: no prior problems with this area in the past.  Symptoms exacerbated by: Admits to increased tenderness with ROM shoulder  Symptoms relieved by: nothing     Specifically denies any cold, blue, icy feeling fingers.  Denies any numbness or tingling of affected right upper extremity.     Past Medical History:   Diagnosis Date    CARDIOVASCULAR SCREENING; LDL GOAL LESS THAN 160 5/12/2017    Globus sensation 5/12/2017    Goiter 5/12/2017    Osgood-Schlatter's disease 3/24/2016    Seasonal allergic rhinitis 3/24/2016     Patient Active Problem List "   Diagnosis    Seasonal allergic rhinitis    Osgood-Schlatter's disease    Globus sensation    Cataract of left eye, unspecified cataract type    RAFAEL (obstructive sleep apnea)     Current Outpatient Medications   Medication    aluminum chloride (DRYSOL) 20 % external solution    fluticasone (FLONASE) 50 MCG/ACT nasal spray     No current facility-administered medications for this visit.     No Known Allergies      OBJECTIVE:  /78   Pulse 80   Temp 98  F (36.7  C)   Resp 20   SpO2 98%          GENERAL:  Very pleasant, comfortable and generally well appear  RIGHT SHOULDER: Unremarkable on inspection. No obvious dislocation. No redness, swelling or bruising. Pt is globally tender about anterior and posterior  aspect of right shoulder on exam. Non-tender on palpation of mid or distal humerus.  I am unable to elicit any focal sites of pain. Increased pain with ROM of shoulder in all  directions (but more-so with abduction and internal rotation). Remainder of RUE exam of forearm, elbow,  wrist, hand and digits is otherwise normal.     SKIN: No laceration, abrasion, hematoma, bruising or swelling     NEURO: Sensation intact to light touch along entire UE and into all fingertips today.  Alert and oriented.  Normal speech and mentation.  CN II/XII grossly intact.  Gait within normal limits.        VASCULAR: No compromise to distal circulation. Brachial and radial pulses are 2+ and equal to that of unaffected extremity. Good/brisk capillary refill to all digits confirmed today.      XR RIGHT SHOULDER: I discussed with patient there is no evidence of dislocation or fracture on my impression.  After patient left clinic today, below radiologist report returned--which is consistent with my impression.      Results for orders placed or performed in visit on 08/01/23   XR Shoulder Right G/E 3 Views     Status: None    Narrative    XR SHOULDER RIGHT G/E 3 VIEWS 8/1/2023 2:27 PM    HISTORY: r/o dislocation and fracture.  right shoulder pain and  decreased ROM after abduction of shoulder and fall yesterday; Acute  pain of right shoulder    COMPARISON: None.      Impression    IMPRESSION: No fracture or dislocation. No degenerative changes.    EL DELONG MD         SYSTEM ID:  [id number]

## 2023-08-01 NOTE — PATIENT INSTRUCTIONS
August 1, 2023 [facility] Plan:       -Ice 20 min, every 2-3 hours for the next several days     - Ibuprofen 600 mg three times daily , with meals, as tolerated for the next 3 to 5 days.      -Use the sling provided when active for comfort support as needed. As we discussed, I would advise you remove the sling frequently to work on very gentle range of motion ( as tolerated) in attempt to avoid frozen shoulder.     -We discussed possibility of rotator cuff tear or other non-bony musculoskeletal injuries that we cannot see on x-ray.     - I advise you to follow-up with an orthopedic doctor in the next 1-2 weeks, sooner if worsening    -Follow-up immediately if you develop any  if any increased pain, redness, bruising or swelling.     - Follow-up immediately in the emergency room if you develop any numbness and tingling of arm, forearm, hand or fingers.

## 2023-09-11 DIAGNOSIS — J30.2 SEASONAL ALLERGIC RHINITIS, UNSPECIFIED TRIGGER: ICD-10-CM

## 2023-09-12 RX ORDER — FLUTICASONE PROPIONATE 50 MCG
2 SPRAY, SUSPENSION (ML) NASAL DAILY
Qty: 48 ML | Refills: 1 | Status: SHIPPED | OUTPATIENT
Start: 2023-09-12 | End: 2024-03-11

## 2023-09-30 ENCOUNTER — OFFICE VISIT (OUTPATIENT)
Dept: ORTHOPEDICS | Facility: CLINIC | Age: 43
End: 2023-09-30
Attending: PHYSICIAN ASSISTANT
Payer: COMMERCIAL

## 2023-09-30 VITALS
BODY MASS INDEX: 28.79 KG/M2 | DIASTOLIC BLOOD PRESSURE: 96 MMHG | HEIGHT: 68 IN | WEIGHT: 190 LBS | SYSTOLIC BLOOD PRESSURE: 138 MMHG

## 2023-09-30 DIAGNOSIS — M25.611 DECREASED RANGE OF MOTION OF RIGHT SHOULDER: ICD-10-CM

## 2023-09-30 DIAGNOSIS — M25.811 IMPINGEMENT OF RIGHT SHOULDER: Primary | ICD-10-CM

## 2023-09-30 DIAGNOSIS — S49.91XA INJURY OF RIGHT SHOULDER, INITIAL ENCOUNTER: ICD-10-CM

## 2023-09-30 DIAGNOSIS — M25.511 ACUTE PAIN OF RIGHT SHOULDER: ICD-10-CM

## 2023-09-30 PROCEDURE — 99243 OFF/OP CNSLTJ NEW/EST LOW 30: CPT | Performed by: FAMILY MEDICINE

## 2023-09-30 NOTE — PATIENT INSTRUCTIONS
1. Impingement of right shoulder    2. Acute pain of right shoulder    3. Decreased range of motion of right shoulder    4. Injury of right shoulder, initial encounter      -Patient has acute right shoulder pain and weakness due to bursitis after fall 2 months ago  -Patient's rotator cuff muscles appear to be intact on physical exam today  -Patient will start formal physical therapy and home exercise program to strengthen and stabilize his shoulder  -Patient may continue with over-the-counter pain medications as needed  -Patient will follow-up if pain does not improve.  -Call direct clinic number [[phone]] at any time with questions or concerns.    Albert Yeo MD CAQSM  [facility]  [facility] [place]         [id number]

## 2023-09-30 NOTE — LETTER
9/30/2023         RE: Anival Galvez  [address] [facility] [place] [address]        Dear Colleague,    Thank you for referring your patient, Anival Galvez, to the [facility] [place]. Please see a copy of my visit note below.    ASSESSMENT & PLAN  Patient Instructions     1. Impingement of right shoulder    2. Acute pain of right shoulder    3. Decreased range of motion of right shoulder    4. Injury of right shoulder, initial encounter      -Patient has acute right shoulder pain and weakness due to bursitis after fall 2 months ago  -Patient's rotator cuff muscles appear to be intact on physical exam today  -Patient will start formal physical therapy and home exercise program to strengthen and stabilize his shoulder  -Patient may continue with over-the-counter pain medications as needed  -Patient will follow-up if pain does not improve.  -Call direct clinic number [[phone]] at any time with questions or concerns.    Albert Yeo MD [facility]  [facility] [place]        -----    SUBJECTIVE  Anival Galvez is a/an 43 year old Left handed male who is seen in consultation at the request of  Chas Collins PA-C for evaluation of right shoulder pain. The patient is seen by themselves.    Onset: 2 month(s) ago - 8/1/23. Patient describes injury as he slipped and fell at a splash pad. Patient describes a FOOSH injury. Patient reports pain has improved since injury.  Location of Pain: right lateral shoulder  Rating of Pain at worst: 8/10  Rating of Pain Currently: 4-5/10  Worsened by: reaching overhead, holding something away from body  Better with: rest / activity avoidance  Treatments tried: rest/activity avoidance, ice, ibuprofen, and previous imaging (xray 8/1/23)  Associated symptoms: decreased ROM, decreased strength  Orthopedic history: NO  Relevant surgical history: NO  Social history: social history: works -  office    Past Medical History:   Diagnosis Date     CARDIOVASCULAR SCREENING; LDL GOAL LESS THAN 160 5/12/2017     Globus sensation 5/12/2017     Goiter 5/12/2017     Osgood-Schlatter's disease 3/24/2016     Seasonal allergic rhinitis 3/24/2016     Social History     Socioeconomic History     Marital status: Single     Number of children: 1   Tobacco Use     Smoking status: Former     Packs/day: 1.00     Years: 5.00     Pack years: 5.00     Types: Cigarettes     Smokeless tobacco: Never   Vaping Use     Vaping Use: Never used   Substance and Sexual Activity     Alcohol use: Yes     Comment: Seldom     Drug use: No     Sexual activity: Yes     Partners: Female     Birth control/protection: Condom   Other Topics Concern     Parent/sibling w/ CABG, MI or angioplasty before 65F 55M? No     Social Determinants of Health     Financial Resource Strain: Low Risk  (7/5/2023)    Overall Financial Resource Strain (CARDIA)      Difficulty of Paying Living Expenses: Not hard at all   Food Insecurity: No Food Insecurity (7/5/2023)    Hunger Vital Sign      Worried About Running Out of Food in the Last Year: Never true      Ran Out of Food in the Last Year: Never true   Transportation Needs: No Transportation Needs (7/5/2023)    PRAPARE - Transportation      Lack of Transportation (Medical): No      Lack of Transportation (Non-Medical): No   Physical Activity: Insufficiently Active (7/5/2023)    Exercise Vital Sign      Days of Exercise per Week: 1 day      Minutes of Exercise per Session: 10 min   Stress: No Stress Concern Present (7/5/2023)    [employer] [place] [employer] - Occupational Stress Questionnaire      Feeling of Stress : Not at all   Social Connections: Socially Isolated (7/5/2023)    Social Connection and Isolation Panel [NHANES]      Frequency of Communication with Friends and Family: Never      Frequency of Social Gatherings with Friends and Family: Once a week      Attends Shinto Services: Never     "  Active Member of Clubs or Organizations: No      Marital Status:    Housing Stability: Low Risk  (7/5/2023)    Housing Stability Vital Sign      Unable to Pay for Housing in the Last Year: No      Number of Places Lived in the Last Year: 1      Unstable Housing in the Last Year: No         Patient's past medical, surgical, social, and family histories were reviewed today and no changes are noted.    REVIEW OF SYSTEMS:  10 point ROS is negative other than symptoms noted above in HPI, Past Medical History or as stated below  Constitutional: NEGATIVE for fever, chills, change in weight  Skin: NEGATIVE for worrisome rashes, moles or lesions  GI/: NEGATIVE for bowel or bladder changes  Neuro: NEGATIVE for weakness, dizziness or paresthesias    OBJECTIVE:  BP (!) 138/96   Ht 1.727 m (5' 8\")   Wt 86.2 kg (190 lb)   BMI 28.89 kg/m     General: healthy, alert and in no distress  HEENT: no scleral icterus or conjunctival erythema  Skin: no suspicious lesions or rash. No jaundice.  CV: regular rhythm by palpation  Resp: normal respiratory effort without conversational dyspnea   Psych: normal mood and affect  Gait: normal steady gait with appropriate coordination and balance  Neuro: normal light touch sensory exam of the bilateral upper extremities.    MSK:  RIGHT SHOULDER  Inspection:    no swelling, bruising, discoloration, or obvious deformity or asymmetry  Palpation:  bony and tendinous landmarks are nontender.  Active Range of Motion:     Abduction 1350, FF 1650, , IR L4.      Scapular dyskinesis absent  Strength:    Scapular plane abduction grossly intact,  ER grossly intact, IR grossly intact, biceps grossly intact, triceps grossly intact  Special Tests:    Positive: Neer's and [place]'    Negative: supraspinatus (empty can), drop arm/painful arc, and [place]'s        Independent visualization of the below image:  No results found for this or any previous visit (from the past 24 hour(s)).  Results for " orders placed or performed in visit on 08/01/23   XR Shoulder Right G/E 3 Views    Narrative    XR SHOULDER RIGHT G/E 3 VIEWS 8/1/2023 2:27 PM    HISTORY: r/o dislocation and fracture. right shoulder pain and  decreased ROM after abduction of shoulder and fall yesterday; Acute  pain of right shoulder    COMPARISON: None.      Impression    IMPRESSION: No fracture or dislocation. No degenerative changes.    EL DELONG MD         SYSTEM ID:  [id number]         Albert Yeo MD [facility]      Again, thank you for allowing me to participate in the care of your patient.        Sincerely,        Albert Yeo, MD

## 2023-09-30 NOTE — PROGRESS NOTES
ASSESSMENT & PLAN  Patient Instructions     1. Impingement of right shoulder    2. Acute pain of right shoulder    3. Decreased range of motion of right shoulder    4. Injury of right shoulder, initial encounter      -Patient has acute right shoulder pain and weakness due to bursitis after fall 2 months ago  -Patient's rotator cuff muscles appear to be intact on physical exam today  -Patient will start formal physical therapy and home exercise program to strengthen and stabilize his shoulder  -Patient may continue with over-the-counter pain medications as needed  -Patient will follow-up if pain does not improve.  -Call direct clinic number [[phone]] at any time with questions or concerns.    Albert Yeo MD [facility]  [facility]        -----    SUBJECTIVE  Anival Galvez is a/an 43 year old Left handed male who is seen in consultation at the request of  Chas Collins PA-C for evaluation of right shoulder pain. The patient is seen by themselves.    Onset: 2 month(s) ago - 8/1/23. Patient describes injury as he slipped and fell at a splash pad. Patient describes a FOOSH injury. Patient reports pain has improved since injury.  Location of Pain: right lateral shoulder  Rating of Pain at worst: 8/10  Rating of Pain Currently: 4-5/10  Worsened by: reaching overhead, holding something away from body  Better with: rest / activity avoidance  Treatments tried: rest/activity avoidance, ice, ibuprofen, and previous imaging (xray 8/1/23)  Associated symptoms: decreased ROM, decreased strength  Orthopedic history: NO  Relevant surgical history: NO  Social history: social history: works - office    Past Medical History:   Diagnosis Date    CARDIOVASCULAR SCREENING; LDL GOAL LESS THAN 160 5/12/2017    Globus sensation 5/12/2017    Goiter 5/12/2017    Osgood-Schlatter's disease 3/24/2016    Seasonal allergic rhinitis 3/24/2016     Social History     Socioeconomic  History    Marital status: Single    Number of children: 1   Tobacco Use    Smoking status: Former     Packs/day: 1.00     Years: 5.00     Pack years: 5.00     Types: Cigarettes    Smokeless tobacco: Never   Vaping Use    Vaping Use: Never used   Substance and Sexual Activity    Alcohol use: Yes     Comment: Seldom    Drug use: No    Sexual activity: Yes     Partners: Female     Birth control/protection: Condom   Other Topics Concern    Parent/sibling w/ CABG, MI or angioplasty before 65F 55M? No     Social Determinants of Health     Financial Resource Strain: Low Risk  (7/5/2023)    Overall Financial Resource Strain (CARDIA)     Difficulty of Paying Living Expenses: Not hard at all   Food Insecurity: No Food Insecurity (7/5/2023)    Hunger Vital Sign     Worried About Running Out of Food in the Last Year: Never true     Ran Out of Food in the Last Year: Never true   Transportation Needs: No Transportation Needs (7/5/2023)    PRAPARE - Transportation     Lack of Transportation (Medical): No     Lack of Transportation (Non-Medical): No   Physical Activity: Insufficiently Active (7/5/2023)    Exercise Vital Sign     Days of Exercise per Week: 1 day     Minutes of Exercise per Session: 10 min   Stress: No Stress Concern Present (7/5/2023)    [place] [place] [employer] Occupational Health - Occupational Stress Questionnaire     Feeling of Stress : Not at all   Social Connections: Socially Isolated (7/5/2023)    Social Connection and Isolation Panel [NHANES]     Frequency of Communication with Friends and Family: Never     Frequency of Social Gatherings with Friends and Family: Once a week     Attends Mandaeism Services: Never     Active Member of Clubs or Organizations: No     Marital Status:    Housing Stability: Low Risk  (7/5/2023)    Housing Stability Vital Sign     Unable to Pay for Housing in the Last Year: No     Number of Places Lived in the Last Year: 1     Unstable Housing in the Last Year: No  "        Patient's past medical, surgical, social, and family histories were reviewed today and no changes are noted.    REVIEW OF SYSTEMS:  10 point ROS is negative other than symptoms noted above in HPI, Past Medical History or as stated below  Constitutional: NEGATIVE for fever, chills, change in weight  Skin: NEGATIVE for worrisome rashes, moles or lesions  GI/: NEGATIVE for bowel or bladder changes  Neuro: NEGATIVE for weakness, dizziness or paresthesias    OBJECTIVE:  BP (!) 138/96   Ht 1.727 m (5' 8\")   Wt 86.2 kg (190 lb)   BMI 28.89 kg/m     General: healthy, alert and in no distress  HEENT: no scleral icterus or conjunctival erythema  Skin: no suspicious lesions or rash. No jaundice.  CV: regular rhythm by palpation  Resp: normal respiratory effort without conversational dyspnea   Psych: normal mood and affect  Gait: normal steady gait with appropriate coordination and balance  Neuro: normal light touch sensory exam of the bilateral upper extremities.    MSK:  RIGHT SHOULDER  Inspection:    no swelling, bruising, discoloration, or obvious deformity or asymmetry  Palpation:  bony and tendinous landmarks are nontender.  Active Range of Motion:     Abduction 1350, FF 1650, , IR L4.      Scapular dyskinesis absent  Strength:    Scapular plane abduction grossly intact,  ER grossly intact, IR grossly intact, biceps grossly intact, triceps grossly intact  Special Tests:    Positive: Neer's and [place]'    Negative: supraspinatus (empty can), drop arm/painful arc, and [place]'s        Independent visualization of the below image:  No results found for this or any previous visit (from the past 24 hour(s)).  Results for orders placed or performed in visit on 08/01/23   XR Shoulder Right G/E 3 Views    Narrative    XR SHOULDER RIGHT G/E 3 VIEWS 8/1/2023 2:27 PM    HISTORY: r/o dislocation and fracture. right shoulder pain and  decreased ROM after abduction of shoulder and fall yesterday; Acute  pain of " right shoulder    COMPARISON: None.      Impression    IMPRESSION: No fracture or dislocation. No degenerative changes.    EL DELONG MD         SYSTEM ID:  [id number]         Albert Yeo MD [facility]

## 2023-10-25 ENCOUNTER — THERAPY VISIT (OUTPATIENT)
Dept: PHYSICAL THERAPY | Facility: CLINIC | Age: 43
End: 2023-10-25
Attending: FAMILY MEDICINE
Payer: COMMERCIAL

## 2023-10-25 DIAGNOSIS — M25.811 IMPINGEMENT OF RIGHT SHOULDER: ICD-10-CM

## 2023-10-25 PROCEDURE — 97110 THERAPEUTIC EXERCISES: CPT | Mod: GP | Performed by: PHYSICAL THERAPIST

## 2023-10-25 PROCEDURE — 97161 PT EVAL LOW COMPLEX 20 MIN: CPT | Mod: GP | Performed by: PHYSICAL THERAPIST

## 2023-10-25 NOTE — PROGRESS NOTES
PHYSICAL THERAPY EVALUATION  Type of Visit: Evaluation    See electronic medical record for Abuse and Falls Screening details.    Subjective       Presenting condition or subjective complaint: R shoulder injury/slipped and fell onto R hand/shoulder  Date of onset: 07/31/23    Relevant medical history:     Dates & types of surgery:      Prior diagnostic imaging/testing results: X-ray     Prior therapy history for the same diagnosis, illness or injury: No      Prior Level of Function  Transfers: Independent  Ambulation: Independent  ADL: Independent  IADL: Driving, Work, Yard work    Living Environment  Social support: With a significant other or spouse   Type of home: House   Stairs to enter the home: Yes 2 Is there a railing: Yes   Ramp: No   Stairs inside the home: Yes   Is there a railing: Yes   Help at home: None  Equipment owned:       Employment: Yes computer work  Hobbies/Interests: play with daughter (throw football, playing basketball)    Patient goals for therapy: more active; carrying, lifting things    Pain assessment: Pain present  Location: front/side/Rating: at rest 0/10, 6/10     Objective     SHOULDER:    Standing Posture: forward shoulders    Cervical Screen: n/a    T-Spine Mobility:  n/a    Shoulder: (* indicates patient's pain)   PROM L PROM R AROM L AROM R MMT L MMT R   Flex    132*     Abd    110*     IR         ER  85  65     Ext/IR   T8 T11       HISTORY:    Present symptoms: patient reports pain located in the R without radiation.  Type of pain is described as aching/sharp and frequency of pain is described as intermittent.   Associated symptoms include:   Present since: July/31/2023 with symptoms improving.  This condition is new  Symptoms commenced as a result of: FOOSH   Condition occurred in the following environment: community   Symptoms at onset: R shoulder  Spinal history: n/a  Constant symptoms:   Intermittent symptoms: R shoulder    (With consideration for bending, sitting, turning  neck, dressing, reaching, gripping, time of day, when still/on the move, sleep postures, other)  Symptoms are worse with the following: reaching, lifting, quick reaching   Symptoms are better with the following: rest/not using     Continued use affects pain in the following way: worse  Presence of pain at rest: no   Sleep disturbance:    Previous episodes: none  Previous treatments: none  Improvement with previous treatments: n/a       Sites for physical examination: R shoulder    OBJECTIVE EXAMINATION:    POSTURE:    Sitting: fair  Correction of Posture: n/a  Standing Posture: n/a    NEUROLOGICAL (motor/sensory/reflex/dural; if applicable): n/a    BASELINES: (pain or functional activity): reaching overhead, out to the side    EXTREMITIES: (Shoulder / Elbow / Wrist / Hand): R shoulder    Shoulder: (* indicates patient's pain)   PROM L PROM R AROM L AROM R MMT L MMT R   Flex    132*     Abd    110*     IR         ER  85  65     Ext/IR   T8 T11         Passive Movement (note with/without overpressure, symptom and range, PDM/ERP):   Resisted Test Response (pain):   Other Tests:     SPINE  Movement loss:   Effect of repeated movements:   Effect of static positioning:   Spine testing (relevant/not relevant/secondary problem):     Baseline Symptoms: painfree  Repeated Tests Symptom Response Mechanical Response   Active/Passive movement, resisted test, functional test During - Produce, Abolish, Increase, Decrease, NE After - Better, Worse, NB, NW, NE Effect - ? or ? ROM, strength or key functional test No Effect   Rep Ext + wand P stretch better Incr Flex, abd    Rep EXT + IR P stretch/pain NW Incr Flex, Ext/IR, ABD                  Effect of static positioning                  Provisional Classification: derangement  Extremity or Spine: extremity  Principle of Management (education/equipment/mechanical therapy/specific principle): rep EXT + wand x 6, rep EXT/IR + pt. Op x 3; repeated 4 x daily; scap stab  strengthening        Assessment & Plan   CLINICAL IMPRESSIONS  Medical Diagnosis: R shoulder impingement/FOOSH    Treatment Diagnosis:     Impression/Assessment: Patient is a 43 year old male with R shoulder complaints.  The following significant findings have been identified: Pain, Decreased ROM/flexibility, and Decreased activity tolerance. These impairments interfere with their ability to perform work tasks and recreational activities as compared to previous level of function.     Clinical Decision Making (Complexity):  Clinical Presentation: Stable/Uncomplicated  Clinical Presentation Rationale: based on medical and personal factors listed in PT evaluation  Clinical Decision Making (Complexity): Low complexity    PLAN OF CARE  Treatment Interventions:  Interventions: Manual Therapy, Neuromuscular Re-education, Therapeutic Activity, Therapeutic Exercise    Long Term Goals     PT Goal 1  Goal Identifier: reaching  Goal Description: able to reach overhead, out to the side, behind back full ROM  Rationale: to maximize safety and independence with performance of ADLs and functional tasks;to maximize safety and independence with self cares;to maximize safety and independence with transportation      Frequency of Treatment: 4  Duration of Treatment: 1    Recommended Referrals to Other Professionals:   Education Assessment:        Risks and benefits of evaluation/treatment have been explained.   Patient/Family/caregiver agrees with Plan of Care.     Evaluation Time:             Signing Clinician: Cecille Casillas, PT

## 2023-11-01 ENCOUNTER — THERAPY VISIT (OUTPATIENT)
Dept: PHYSICAL THERAPY | Facility: CLINIC | Age: 43
End: 2023-11-01
Attending: FAMILY MEDICINE
Payer: COMMERCIAL

## 2023-11-01 DIAGNOSIS — M25.811 IMPINGEMENT OF RIGHT SHOULDER: Primary | ICD-10-CM

## 2023-11-01 PROCEDURE — 97110 THERAPEUTIC EXERCISES: CPT | Mod: GP | Performed by: PHYSICAL THERAPIST

## 2023-11-01 NOTE — PROGRESS NOTES
DISCHARGE  Reason for Discharge: Patient has met all goals.    Equipment Issued: none    Discharge Plan: Patient to continue home program.    Referring Provider:  Albert Yeo     11/01/23 0500   Appointment Info   Signing clinician's name / credentials Cecille Casillas PT Cert MDT   Total/Authorized Visits 6   Visits Used 2   Medical Diagnosis R shoulder impingement/FOOSH   Progress Note/Certification   Onset of illness/injury or Date of Surgery 07/31/23   Therapy Frequency 4   Predicted Duration 1   PT Goal 1   Goal Identifier reaching   Goal Description able to reach overhead, out to the side, behind back full ROM   Rationale to maximize safety and independence with performance of ADLs and functional tasks;to maximize safety and independence with self cares;to maximize safety and independence with transportation   Target Date 11/24/23   Subjective Report   Subjective Report reports he is 95% better; good compliance with HEP;   Objective Measures   Objective Measures Objective Measure 1;Objective Measure 2   Objective Measure 1   Objective Measure AROM   Details Flex=min to nil loss erp, Ext=nil loss, IR=min loss   Objective Measure 2   Objective Measure SPADI   Details 7.69   Treatment Interventions (PT)   Interventions Therapeutic Procedure/Exercise   Therapeutic Procedure/Exercise   Therapeutic Procedures: strength, endurance, ROM, flexibillity minutes (67958) 25   PTRx Ther Proc 1 rep SH EXT at plinth   PTRx Ther Proc 1 - Details repeated 2 sets: P/NW/incr ROM;   PTRx Ther Proc 2 Internal Rotation Behind Back with Overpressure   PTRx Ther Proc 2 - Details repeated x 2 sets 5 reps Pj/NW/incr ROM; to perform at end of set of EXT    PTRx Ther Proc 3 Shoulder Extension in Standing   PTRx Ther Proc 3 - Details 2 x fatigue; mc for form   PTRx Ther Proc 4 Shoulder Horizontal Abduction Standing   PTRx Ther Proc 4 - Details 2 x fatigue mc for form   Ther Proc 2 education   Ther Proc 2 - Details gym exercise selection  including seated rows. lat pull downs, scaption w/free weight   Ther Proc 1 trial of rep Horiz Adduc   Ther Proc 1 - Details x 10: incr/W/decr ROM   Therapeutic Procedures Ther Proc 2   Education   Learner/Method Patient;No Barriers to Learning   Plan   Home program PTRX   Total Session Time   Timed Code Treatment Minutes 25   Total Treatment Time (sum of timed and untimed services) 25

## 2024-01-02 ENCOUNTER — TRANSFERRED RECORDS (OUTPATIENT)
Dept: HEALTH INFORMATION MANAGEMENT | Facility: CLINIC | Age: 44
End: 2024-01-02
Payer: COMMERCIAL

## 2024-01-04 ENCOUNTER — MYC MEDICAL ADVICE (OUTPATIENT)
Dept: FAMILY MEDICINE | Facility: CLINIC | Age: 44
End: 2024-01-04
Payer: COMMERCIAL

## 2024-01-04 ENCOUNTER — NURSE TRIAGE (OUTPATIENT)
Dept: FAMILY MEDICINE | Facility: CLINIC | Age: 44
End: 2024-01-04
Payer: COMMERCIAL

## 2024-01-04 NOTE — TELEPHONE ENCOUNTER
Discussed with kain Urena for appointment Monday since stable, called pt, appointment scheduled  Lynn Guzman RN, BSN  [facility]

## 2024-01-04 NOTE — TELEPHONE ENCOUNTER
Pt calls, see [employer] message,     S-(situation): & B-(background): pt has diagnosis of influenza ! 1/2/24 at [facility], told to follow up with PCP office to recheck lungs as heard rales 1/3 up right lung, pt doing much better, see triage note, no red flag issues, main concern is productive cough, denies breathing issues, fever, no concerns with appetite    A-(assessment): follow up appointment question    R-(recommendations): discussed possible contagious with influenza A for tomorrow, pt appears stable, routed to kain Malagon for appointment on Monday or should be see same day provider tomorrow (we have appointments available)    ROUTE to TC or triage to call pt and schedule       Additional Information   Negative: SEVERE difficulty breathing (e.g., struggling for each breath, speaks in single words)   Negative: Bluish (or gray) lips or face   Negative: Shock suspected (e.g., cold/pale/clammy skin, too weak to stand, low BP, rapid pulse)   Negative: Sounds like a life-threatening emergency to the triager   Negative: Asthma attack (coughing, wheezing) is main concern and previously diagnosed with asthma OR using asthma medicines   Negative: Chest pain  (Exception: MILD central chest pain, present only when coughing.)   Negative: Headache and stiff neck (can't touch chin to chest)   Negative: Difficulty breathing that is not severe and not relieved by cleaning out the nose   Negative: Patient sounds very sick or weak to the triager   Negative: Fever > 104 F (40 C)   Negative: Fever present > 3 days (72 hours)   Negative: Fever returns after gone for over 24 hours and symptoms worse or not improved   Negative: Using nasal washes and pain medicine > 24 hours and sinus pain (around cheekbone or eye) persists   Negative: Earache   Negative: Patient wants to be seen   Negative: Nasal discharge present > 10 days   Negative: Cough present > 3 weeks   Negative: Influenza diagnosed by doctor (or NP/PA) and no  "complications   Negative: Influenza and antiviral drugs, questions about   Negative: Influenza and breastfeeding, questions about   Negative: Influenza and internet resources, questions about    Answer Assessment - Initial Assessment Questions  1. DIAGNOSIS CONFIRMATION: \"When was the influenza diagnosed?\" \"By whom?\" \"Did you get a test for it?\"      1/2/24, [facility] clinic, yes was tested  2. MEDICINES: \"Were you prescribed any medications for the influenza?\"  (e.g., zanamivir [Relenza], oseltamavir [Tamiflu]).       No  3. ONSET of SYMPTOMS: \"When did your symptoms start?\"      12/28/24  4. SYMPTOMS: \"What symptoms are you most concerned about?\" (e.g., runny nose, stuffy nose, sore throat, cough, breathing difficulty, fever)      cough  5. COUGH: \"How bad is the cough?\"      Interferes with sleep   6. FEVER: \"Do you have a fever?\" If Yes, ask: \"What is your temperature, how was it measured, and when did it start?\"      No  7. RESPIRATORY DISTRESS: \"Are you having any trouble breathing?\" If Yes, ask: \"Describe your breathing.\"       No  8. FLU VACCINE: \"Did you receive a flu shot this year?\" (e.g., seasonal influenza, H1N1)      No  9. PREGNANCY: \"Is there any chance you are pregnant?\" \"When was your last menstrual period?\"      N/a  10. HIGH RISK for COMPLICATIONS: \"Do you have any heart or lung problems? Do you have a weakened immune system?\" (e.g., CHF, COPD, asthma, HIV positive, chemotherapy, renal failure, diabetes mellitus, sickle cell anemia)        No    Protocols used: Influenza (Flu) Follow-Up Call-A-[place]    Lynn Guzman RN, BSN  [facility]    "

## 2024-01-08 ENCOUNTER — ANCILLARY PROCEDURE (OUTPATIENT)
Dept: GENERAL RADIOLOGY | Facility: CLINIC | Age: 44
End: 2024-01-08
Attending: PHYSICIAN ASSISTANT
Payer: COMMERCIAL

## 2024-01-08 ENCOUNTER — OFFICE VISIT (OUTPATIENT)
Dept: FAMILY MEDICINE | Facility: CLINIC | Age: 44
End: 2024-01-08
Payer: COMMERCIAL

## 2024-01-08 VITALS
OXYGEN SATURATION: 93 % | BODY MASS INDEX: 28.46 KG/M2 | TEMPERATURE: 99.5 F | SYSTOLIC BLOOD PRESSURE: 129 MMHG | HEIGHT: 68 IN | WEIGHT: 187.8 LBS | DIASTOLIC BLOOD PRESSURE: 77 MMHG | RESPIRATION RATE: 16 BRPM | HEART RATE: 111 BPM

## 2024-01-08 DIAGNOSIS — R05.1 ACUTE COUGH: ICD-10-CM

## 2024-01-08 DIAGNOSIS — R05.1 ACUTE COUGH: Primary | ICD-10-CM

## 2024-01-08 DIAGNOSIS — J10.1 INFLUENZA A: ICD-10-CM

## 2024-01-08 DIAGNOSIS — J18.9 PNEUMONIA OF LOWER LOBE DUE TO INFECTIOUS ORGANISM, UNSPECIFIED LATERALITY: ICD-10-CM

## 2024-01-08 PROCEDURE — 71046 X-RAY EXAM CHEST 2 VIEWS: CPT | Mod: TC | Performed by: RADIOLOGY

## 2024-01-08 PROCEDURE — 99213 OFFICE O/P EST LOW 20 MIN: CPT | Performed by: PHYSICIAN ASSISTANT

## 2024-01-08 RX ORDER — AZITHROMYCIN 250 MG/1
TABLET, FILM COATED ORAL
Qty: 6 TABLET | Refills: 0 | Status: SHIPPED | OUTPATIENT
Start: 2024-01-08 | End: 2024-01-13

## 2024-01-08 NOTE — PROGRESS NOTES
"  Assessment & Plan     Pneumonia of lower lobe due to infectious organism, unspecified laterality  Given exam there is concern for possible pneumonia. X-ray of the chest obtained today. Treated empirically as noted below.  - azithromycin (ZITHROMAX) 250 MG tablet; Take 2 tablets (500 mg) by mouth daily for 1 day, THEN 1 tablet (250 mg) daily for 4 days.  - amoxicillin-clavulanate (AUGMENTIN) 875-125 MG tablet; Take 1 tablet by mouth 2 times daily for 7 days    Acute cough    - XR Chest 2 Views; Future    Influenza A    - XR Chest 2 Views; Future    Ordering of each unique test  Prescription drug management      Sandie Pierce PA-C  [facility]    Nuria Florian is a 43 year old, presenting for the following health issues:  Flu (Nagging cough/ lungs heavy/ diarrhea/(Tried Theraflu, Nyquil, musinex, cough drops) //Tested positive for Influ A on 01-2-2024 no treatment/Labored breathing two weeks now (all family members same symptoms/ Influ A)/Neg Covid test results on 01-)        1/8/2024     8:38 AM   Additional Questions   Roomed by coy [facility]     Patient reports his symptoms started 12/28/23 with cough and fevers. His daughter was also ill and tested positive for influenza A which prompted patient to be evaluated. He tested positive for influenza A on 1/2/24. No treatment provided due to length of time since symptoms onset (seen in [facility]). Patient reports they recommended being seen in clinic for recheck due to abnormal lung sounds noted on exam in [facility]. He no longer is having fevers but continues to not feel well with cough.      Review of Systems   GENERAL:  As noted in HPI  RESP:  As noted in HPI        Objective    /77 (BP Location: Right arm, Patient Position: Sitting, Cuff Size: Adult Regular)   Pulse 111   Temp 99.5  F (37.5  C) (Oral)   Resp 16   Ht 1.727 m (5' 8\")   Wt 85.2 kg (187 lb 12.8 oz)   SpO2 93%   BMI 28.55 " kg/m    Body mass index is 28.55 kg/m .  Physical Exam   GENERAL: No acute distress  HEENT: Normocephalic  CARDIAC: Regular rate and rhythm. No murmurs.  PULMONARY: Crackles in the right and left lower lobes. Otherwise lungs clear to auscultation.  NEURO: Alert and non-focal

## 2024-02-16 ENCOUNTER — IMMUNIZATION (OUTPATIENT)
Dept: FAMILY MEDICINE | Facility: CLINIC | Age: 44
End: 2024-02-16
Payer: COMMERCIAL

## 2024-02-16 DIAGNOSIS — Z23 ENCOUNTER FOR IMMUNIZATION: Primary | ICD-10-CM

## 2024-02-16 PROCEDURE — 90471 IMMUNIZATION ADMIN: CPT

## 2024-02-16 PROCEDURE — 99207 PR NO CHARGE NURSE ONLY: CPT

## 2024-02-16 PROCEDURE — 90686 IIV4 VACC NO PRSV 0.5 ML IM: CPT

## 2024-02-16 PROCEDURE — 90480 ADMN SARSCOV2 VAC 1/ONLY CMP: CPT

## 2024-02-16 PROCEDURE — 91320 SARSCV2 VAC 30MCG TRS-SUC IM: CPT

## 2024-02-16 NOTE — PROGRESS NOTES
Prior to immunization administration, verified patients identity using patient s name and date of birth. Please see Immunization Activity for additional information.     Screening Questionnaire for Adult Immunization    Are you sick today?   No   Do you have allergies to medications, food, a vaccine component or latex?   No   Have you ever had a serious reaction after receiving a vaccination?   No   Do you have a long-term health problem with heart, lung, kidney, or metabolic disease (e.g., diabetes), asthma, a blood disorder, no spleen, complement component deficiency, a cochlear implant, or a spinal fluid leak?  Are you on long-term aspirin therapy?   No   Do you have cancer, leukemia, HIV/AIDS, or any other immune system problem?   No   Do you have a parent, brother, or sister with an immune system problem?   No   In the past 3 months, have you taken medications that affect  your immune system, such as prednisone, other steroids, or anticancer drugs; drugs for the treatment of rheumatoid arthritis, Crohn s disease, or psoriasis; or have you had radiation treatments?   No   Have you had a seizure, or a brain or other nervous system problem?   No   During the past year, have you received a transfusion of blood or blood    products, or been given immune (gamma) globulin or antiviral drug?   No   For women: Are you pregnant or is there a chance you could become       pregnant during the next month?   No   Have you received any vaccinations in the past 4 weeks?   No     Immunization questionnaire answers were all negative.    I have reviewed the following standing orders:   This patient is due and qualifies for the Covid-19 vaccine.     Click here for COVID-19 Standing Order    I have reviewed the vaccines inclusion and exclusion criteria; No concerns regarding eligibility.     This patient is due and qualifies for the Influenza vaccine.    Click here for Influenza Vaccine Standing Order    I have reviewed the  vaccines inclusion and exclusion criteria; No concerns regarding eligibility.     Patient instructed to remain in clinic for 15 minutes afterwards, and to report any adverse reactions.     Screening performed by Inez Damon MA on 2/16/2024 at 1:29 PM.

## 2024-03-09 DIAGNOSIS — J30.2 SEASONAL ALLERGIC RHINITIS, UNSPECIFIED TRIGGER: ICD-10-CM

## 2024-03-11 RX ORDER — FLUTICASONE PROPIONATE 50 MCG
2 SPRAY, SUSPENSION (ML) NASAL DAILY
Qty: 48 ML | Refills: 1 | Status: SHIPPED | OUTPATIENT
Start: 2024-03-11 | End: 2024-09-18

## 2024-04-24 ENCOUNTER — TRANSFERRED RECORDS (OUTPATIENT)
Dept: HEALTH INFORMATION MANAGEMENT | Facility: CLINIC | Age: 44
End: 2024-04-24
Payer: COMMERCIAL

## 2024-04-25 ENCOUNTER — E-VISIT (OUTPATIENT)
Dept: URGENT CARE | Facility: CLINIC | Age: 44
End: 2024-04-25
Payer: COMMERCIAL

## 2024-04-25 ENCOUNTER — LAB (OUTPATIENT)
Dept: LAB | Facility: CLINIC | Age: 44
End: 2024-04-25
Attending: PHYSICIAN ASSISTANT
Payer: COMMERCIAL

## 2024-04-25 DIAGNOSIS — J02.9 SORE THROAT: Primary | ICD-10-CM

## 2024-04-25 DIAGNOSIS — J02.9 SORE THROAT: ICD-10-CM

## 2024-04-25 LAB
DEPRECATED S PYO AG THROAT QL EIA: NEGATIVE
GROUP A STREP BY PCR: NOT DETECTED

## 2024-04-25 PROCEDURE — 87651 STREP A DNA AMP PROBE: CPT

## 2024-04-25 PROCEDURE — 99421 OL DIG E/M SVC 5-10 MIN: CPT | Performed by: PHYSICIAN ASSISTANT

## 2024-04-25 NOTE — PATIENT INSTRUCTIONS
Dear Anival,    After reviewing your responses, I would like you to come in for a swab to make sure we treat you correctly. This swab is to evaluate you for possible Strep Throat, and should be scheduled for today or tomorrow. Please use the Schedule Now button in [facility] to schedule your swab. Otherwise, click this link to schedule a lab only appointment.    Lab appointments are not available at most locations on the weekends. If no Lab Only appointment is available, you should be seen in any of our convenient Urgent Care Centers for an in person visit, which can be found on our website here.    You will receive instructions with your results in [facility] once they are available.     If your symptoms worsen, you develop difficulty breathing, difficulty with drinking enough to stay hydrated, difficulty swallowing your saliva or have fevers for more than 5 days, please contact your primary care provider for an appointment or visit an Urgent Care Center to be seen.      Thanks again for choosing us as your health care partner.   Mary Anne Balderas PA-C  Sore Throat: Care Instructions  Overview     Infection by bacteria or a virus causes most sore throats. Cigarette smoke, dry air, air pollution, allergies, and yelling can also cause a sore throat. Sore throats can be painful and annoying. Fortunately, most sore throats go away on their own. If you have a bacterial infection, your doctor may prescribe antibiotics.  Follow-up care is a key part of your treatment and safety. Be sure to make and go to all appointments, and call your doctor if you are having problems. It's also a good idea to know your test results and keep a list of the medicines you take.  How can you care for yourself at home?  If your doctor prescribed antibiotics, take them as directed. Do not stop taking them just because you feel better. You need to take the full course of antibiotics.  Gargle with warm salt water several times a day to help reduce  "swelling and relieve pain. Mix 1/2 teaspoon of salt in 1 cup of warm water.  Take an over-the-counter pain medicine, such as acetaminophen (Tylenol), ibuprofen (Advil, Motrin), or naproxen (Aleve). Read and follow all instructions on the label.  Be careful when taking over-the-counter cold or flu medicines and Tylenol at the same time. Many of these medicines have acetaminophen, which is Tylenol. Read the labels to make sure that you are not taking more than the recommended dose. Too much acetaminophen (Tylenol) can be harmful.  Drink plenty of fluids. Fluids may help soothe an irritated throat. Hot fluids, such as tea or soup, may help decrease throat pain.  Use over-the-counter throat lozenges to soothe pain. Regular cough drops or hard candy may also help. These should not be given to young children because of the risk of choking.  Do not smoke or allow others to smoke around you. If you need help quitting, talk to your doctor about stop-smoking programs and medicines. These can increase your chances of quitting for good.  Use a vaporizer or humidifier to add moisture to your bedroom. Follow the directions for cleaning the machine.  When should you call for help?   Call your doctor now or seek immediate medical care if:    You have trouble breathing.     Your sore throat gets much worse on one side.     You have new or worse trouble swallowing.     You have a new or higher fever.   Watch closely for changes in your health, and be sure to contact your doctor if you do not get better as expected.  Where can you learn more?  Go to https://www.[employer].net/patiented  Enter U420 in the search box to learn more about \"Sore Throat: Care Instructions.\"  Current as of: September 27, 2023               Content Version: 14.0    8194-3218 [employer].   Care instructions adapted under license by your healthcare professional. If you have questions about a medical condition or this instruction, always ask your " healthcare professional. [employer] disclaims any warranty or liability for your use of this information.

## 2024-06-05 ENCOUNTER — PATIENT OUTREACH (OUTPATIENT)
Dept: CARE COORDINATION | Facility: CLINIC | Age: 44
End: 2024-06-05
Payer: COMMERCIAL

## 2024-06-19 ENCOUNTER — PATIENT OUTREACH (OUTPATIENT)
Dept: CARE COORDINATION | Facility: CLINIC | Age: 44
End: 2024-06-19
Payer: COMMERCIAL

## 2024-07-16 SDOH — HEALTH STABILITY: PHYSICAL HEALTH: ON AVERAGE, HOW MANY MINUTES DO YOU ENGAGE IN EXERCISE AT THIS LEVEL?: 20 MIN

## 2024-07-16 SDOH — HEALTH STABILITY: PHYSICAL HEALTH: ON AVERAGE, HOW MANY DAYS PER WEEK DO YOU ENGAGE IN MODERATE TO STRENUOUS EXERCISE (LIKE A BRISK WALK)?: 6 DAYS

## 2024-07-16 ASSESSMENT — SOCIAL DETERMINANTS OF HEALTH (SDOH): HOW OFTEN DO YOU GET TOGETHER WITH FRIENDS OR RELATIVES?: ONCE A WEEK

## 2024-07-17 ENCOUNTER — OFFICE VISIT (OUTPATIENT)
Dept: FAMILY MEDICINE | Facility: CLINIC | Age: 44
End: 2024-07-17
Payer: COMMERCIAL

## 2024-07-17 VITALS
DIASTOLIC BLOOD PRESSURE: 83 MMHG | WEIGHT: 187.8 LBS | HEIGHT: 68 IN | BODY MASS INDEX: 28.46 KG/M2 | OXYGEN SATURATION: 94 % | HEART RATE: 87 BPM | RESPIRATION RATE: 18 BRPM | SYSTOLIC BLOOD PRESSURE: 130 MMHG | TEMPERATURE: 97.7 F

## 2024-07-17 DIAGNOSIS — G47.33 OSA (OBSTRUCTIVE SLEEP APNEA): ICD-10-CM

## 2024-07-17 DIAGNOSIS — R10.31 RIGHT INGUINAL PAIN: ICD-10-CM

## 2024-07-17 DIAGNOSIS — Z00.00 ROUTINE GENERAL MEDICAL EXAMINATION AT A HEALTH CARE FACILITY: Primary | ICD-10-CM

## 2024-07-17 PROBLEM — H26.9 CATARACT OF LEFT EYE, UNSPECIFIED CATARACT TYPE: Status: RESOLVED | Noted: 2020-09-10 | Resolved: 2024-07-17

## 2024-07-17 PROBLEM — R09.A2 GLOBUS SENSATION: Status: RESOLVED | Noted: 2017-05-12 | Resolved: 2024-07-17

## 2024-07-17 LAB — HBA1C MFR BLD: 6.2 % (ref 0–5.6)

## 2024-07-17 PROCEDURE — 36415 COLL VENOUS BLD VENIPUNCTURE: CPT | Performed by: FAMILY MEDICINE

## 2024-07-17 PROCEDURE — 80061 LIPID PANEL: CPT | Performed by: FAMILY MEDICINE

## 2024-07-17 PROCEDURE — 99396 PREV VISIT EST AGE 40-64: CPT | Performed by: FAMILY MEDICINE

## 2024-07-17 PROCEDURE — 99213 OFFICE O/P EST LOW 20 MIN: CPT | Mod: 25 | Performed by: FAMILY MEDICINE

## 2024-07-17 PROCEDURE — 83036 HEMOGLOBIN GLYCOSYLATED A1C: CPT | Performed by: FAMILY MEDICINE

## 2024-07-17 PROCEDURE — 82947 ASSAY GLUCOSE BLOOD QUANT: CPT | Performed by: FAMILY MEDICINE

## 2024-07-17 ASSESSMENT — PAIN SCALES - GENERAL: PAINLEVEL: MODERATE PAIN (4)

## 2024-07-17 NOTE — PROGRESS NOTES
"Preventive Care Visit  [facility]  Alexsander Funes MD, Family Medicine  Jul 17, 2024      Assessment & Plan   Problem List Items Addressed This Visit       RAFAEL (obstructive sleep apnea)     Pt does not use CPAP machine.         Right inguinal pain     Started about 2 weeks ago, gradually, pt has been walking more often, he noticed the pain when he stands up, or if he walking for a long time,   Mostly strain, recommend watchful monitoring and call if symptoms persist.          Other Visit Diagnoses       Routine general medical examination at a health care facility    -  Primary    Relevant Orders    Glucose    Hemoglobin A1c    Lipid panel reflex to direct LDL Non-fasting                    BMI  Estimated body mass index is 28.55 kg/m  as calculated from the following:    Height as of this encounter: 1.727 m (5' 8\").    Weight as of this encounter: 85.2 kg (187 lb 12.8 oz).   Weight management plan: Discussed healthy diet and exercise guidelines    Counseling  Appropriate preventive services were addressed with this patient via screening, questionnaire, or discussion as appropriate for fall prevention, nutrition, physical activity, Tobacco-use cessation, weight loss and cognition.  Checklist reviewing preventive services available has been given to the patient.  Reviewed patient's diet, addressing concerns and/or questions.           Nuria Florian is a 43 year old, presenting for the following:  Physical        7/17/2024     4:49 PM   Additional Questions   Roomed by ciera ken   Accompanied by self        Health Care Directive  Patient does not have a Health Care Directive or Living Will: Discussed advance care planning with patient; however, patient declined at this time.    HPI              7/16/2024   General Health   How would you rate your overall physical health? Good   Feel stress (tense, anxious, or unable to sleep) To some extent      (!) STRESS CONCERN      7/16/2024   Nutrition "   Three or more servings of calcium each day? Yes   Diet: Regular (no restrictions)   How many servings of fruit and vegetables per day? (!) 0-1   How many sweetened beverages each day? (!) 2            7/16/2024   Exercise   Days per week of moderate/strenous exercise 6 days   Average minutes spent exercising at this level 20 min            7/16/2024   Social Factors   Frequency of gathering with friends or relatives Once a week   Worry food won't last until get money to buy more No   Food not last or not have enough money for food? No   Do you have housing? (Housing is defined as stable permanent housing and does not include staying ouside in a car, in a tent, in an abandoned building, in an overnight shelter, or couch-surfing.) No   Are you worried about losing your housing? No   Lack of transportation? No   Unable to get utilities (heat,electricity)? No   Want help with housing or utility concern? No      (!) HOUSING CONCERN PRESENT      7/16/2024   Dental   Dentist two times every year? Yes            7/16/2024   TB Screening   Were you born outside of the [place]? No                  7/16/2024   Substance Use   Alcohol more than 3/day or more than 7/wk No   Do you use any other substances recreationally? No        Social History     Tobacco Use    Smoking status: Former     Current packs/day: 1.00     Average packs/day: 1 pack/day for 5.0 years (5.0 ttl pk-yrs)     Types: Cigarettes    Smokeless tobacco: Never   Vaping Use    Vaping status: Never Used   Substance Use Topics    Alcohol use: Yes     Comment: Seldom    Drug use: No           7/16/2024   STI Screening   New sexual partner(s) since last STI/HIV test? No      ASCVD Risk   The 10-year ASCVD risk score (Racheal LERMA, et al., 2019) is: 1.7%    Values used to calculate the score:      Age: 43 years      Sex: Male      Is Non- : No      Diabetic: No      Tobacco smoker: No      Systolic Blood Pressure: 130 mmHg      Is BP  "treated: No      HDL Cholesterol: 46 mg/dL      Total Cholesterol: 205 mg/dL        7/16/2024   Contraception/Family Planning   Questions about contraception or family planning No           Reviewed and updated as needed this visit by Provider                    Past Medical History:   Diagnosis Date    CARDIOVASCULAR SCREENING; LDL GOAL LESS THAN 160 05/12/2017    Cataract of left eye, unspecified cataract type 09/10/2020    Globus sensation 05/12/2017    Goiter 05/12/2017    Osgood-Schlatter's disease 03/24/2016    Seasonal allergic rhinitis 03/24/2016     Past Surgical History:   Procedure Laterality Date    EYE SURGERY  9/22/2020    Cataract removal left eye    wisdom teeth           Review of Systems  Constitutional, HEENT, cardiovascular, pulmonary, GI, , musculoskeletal, neuro, skin, endocrine and psych systems are negative, except as otherwise noted.     Objective    Exam  /83 (BP Location: Left arm, Patient Position: Chair, Cuff Size: Adult Regular)   Pulse 87   Temp 97.7  F (36.5  C) (Temporal)   Resp 18   Ht 1.727 m (5' 8\")   Wt 85.2 kg (187 lb 12.8 oz)   SpO2 94%   BMI 28.55 kg/m     Estimated body mass index is 28.55 kg/m  as calculated from the following:    Height as of this encounter: 1.727 m (5' 8\").    Weight as of this encounter: 85.2 kg (187 lb 12.8 oz).    Physical Exam  GENERAL: alert and no distress  EYES: Eyes grossly normal to inspection, PERRL and conjunctivae and sclerae normal  HENT: ear canals and TM's normal, nose and mouth without ulcers or lesions  NECK: no adenopathy, no asymmetry, masses, or scars  RESP: lungs clear to auscultation - no rales, rhonchi or wheezes  CV: regular rate and rhythm, normal S1 S2, no S3 or S4, no murmur, click or rub, no peripheral edema  ABDOMEN: soft, nontender, no hepatosplenomegaly, no masses and bowel sounds normal  MS: Hip exam :   Gait :nl ( no trendelenburg gait), no leg length discrepancy   ROM : internal rotation nl external rotation " nl  DRISS test nl FADIR test nl  Rolling test negative  Palpation : no tenderness on the trochanteric bursa,no palpation of the SI joint, no joint tenderness.  Motor :normal muscle strength in the lower extremities.  Sensation : intact  SKIN: no suspicious lesions or rashes  NEURO: Normal strength and tone, mentation intact and speech normal  PSYCH: mentation appears normal, affect normal/bright        Signed Electronically by: Alexsander Funes MD

## 2024-07-17 NOTE — PATIENT INSTRUCTIONS
Patient Education   Preventive Care Advice   This is general advice given by our system to help you stay healthy. However, your care team may have specific advice just for you. Please talk to your care team about your preventive care needs.  Nutrition  Eat 5 or more servings of fruits and vegetables each day.  Try wheat bread, brown rice and whole grain pasta (instead of white bread, rice, and pasta).  Get enough calcium and vitamin D. Check the label on foods and aim for 100% of the RDA (recommended daily allowance).  Lifestyle  Exercise at least 150 minutes each week  (30 minutes a day, 5 days a week).  Do muscle strengthening activities 2 days a week. These help control your weight and prevent disease.  No smoking.  Wear sunscreen to prevent skin cancer.  Have a dental exam and cleaning every 6 months.  Yearly exams  See your health care team every year to talk about:  Any changes in your health.  Any medicines your care team has prescribed.  Preventive care, family planning, and ways to prevent chronic diseases.  Shots (vaccines)   HPV shots (up to age 26), if you've never had them before.  Hepatitis B shots (up to age 59), if you've never had them before.  COVID-19 shot: Get this shot when it's due.  Flu shot: Get a flu shot every year.  Tetanus shot: Get a tetanus shot every 10 years.  Pneumococcal, hepatitis A, and RSV shots: Ask your care team if you need these based on your risk.  Shingles shot (for age 50 and up)  General health tests  Diabetes screening:  Starting at age 35, Get screened for diabetes at least every 3 years.  If you are younger than age 35, ask your care team if you should be screened for diabetes.  Cholesterol test: At age 39, start having a cholesterol test every 5 years, or more often if advised.  Bone density scan (DEXA): At age 50, ask your care team if you should have this scan for osteoporosis (brittle bones).  Hepatitis C: Get tested at least once in your life.  STIs (sexually  transmitted infections)  Before age 24: Ask your care team if you should be screened for STIs.  After age 24: Get screened for STIs if you're at risk. You are at risk for STIs (including HIV) if:  You are sexually active with more than one person.  You don't use condoms every time.  You or a partner was diagnosed with a sexually transmitted infection.  If you are at risk for HIV, ask about PrEP medicine to prevent HIV.  Get tested for HIV at least once in your life, whether you are at risk for HIV or not.  Cancer screening tests  Cervical cancer screening: If you have a cervix, begin getting regular cervical cancer screening tests starting at age 21.  Breast cancer scan (mammogram): If you've ever had breasts, begin having regular mammograms starting at age 40. This is a scan to check for breast cancer.  Colon cancer screening: It is important to start screening for colon cancer at age 45.  Have a colonoscopy test every 10 years (or more often if you're at risk) Or, ask your provider about stool tests like a FIT test every year or Cologuard test every 3 years.  To learn more about your testing options, visit:   .  For help making a decision, visit:   https://bit.ly/dl59325.  Prostate cancer screening test: If you have a prostate, ask your care team if a prostate cancer screening test (PSA) at age 55 is right for you.  Lung cancer screening: If you are a current or former smoker ages 50 to 80, ask your care team if ongoing lung cancer screenings are right for you.  For informational purposes only. Not to replace the advice of your health care provider. Copyright   2023 [employer]. All rights reserved. Clinically reviewed by the [facility]. [employer] 665661 - REV 01/24.

## 2024-07-17 NOTE — ASSESSMENT & PLAN NOTE
Started about 2 weeks ago, gradually, pt has been walking more often, he noticed the pain when he stands up, or if he walking for a long time,   Mostly strain, recommend watchful monitoring and call if symptoms persist.

## 2024-07-18 LAB
CHOLEST SERPL-MCNC: 177 MG/DL
FASTING STATUS PATIENT QL REPORTED: NO
FASTING STATUS PATIENT QL REPORTED: NO
GLUCOSE SERPL-MCNC: 94 MG/DL (ref 70–99)
HDLC SERPL-MCNC: 38 MG/DL
LDLC SERPL CALC-MCNC: 80 MG/DL
NONHDLC SERPL-MCNC: 139 MG/DL
TRIGL SERPL-MCNC: 297 MG/DL

## 2024-07-19 ENCOUNTER — TELEPHONE (OUTPATIENT)
Dept: FAMILY MEDICINE | Facility: CLINIC | Age: 44
End: 2024-07-19
Payer: COMMERCIAL

## 2024-07-19 NOTE — LETTER
July 23, 2024      Anival Galvez  [address] [place] [id number]        Nicci Florian,      We were unable to reach you by phone. Alexsander Funes MD requested we reach out with your lab results. Dr. Funes states, your triglycerides and blood sugar are getting worse. This is serious, as you are at risk for developing diabetes if you do not implement diet changes, exercise (2.5 hr per week), and weight loss.          Sincerely,        Alexsander Funes MD / Arlene Yao RN            S/w pt. Having some slight vision changes occasionally in the morning where he is unable to see clearly, resolves within an hour. Pt would like to make sure tumor has not grown. Asked pt to reach out to primary care doctor to order the MRI scan and will schedule appointment once that has been scheduled. Pt very happy with this plan.

## 2024-07-19 NOTE — TELEPHONE ENCOUNTER
TG and blood sugar are getting worse, this is serious, he is heading into developing diabetes if he doesn't take care of this, please focus on diet, exercise (2 and half hours a week), and weight loss.  Recheck in 1 year.  Alexsander Funes MD  [facility].   [phone]

## 2024-07-22 NOTE — TELEPHONE ENCOUNTER
Message #2 left to return call     My chart sent     Nakia Mcgarry, Registered Nurse  [facility]

## 2024-07-23 NOTE — TELEPHONE ENCOUNTER
Outgoing call to patient. Attempt # 3. Left message to call back any triage nurse. Also sent mychart and letter.    Arlene Yao RN on 7/23/2024 at 8:21 AM

## 2024-07-23 NOTE — TELEPHONE ENCOUNTER
Pt calls back, relayed provider message below. Pt verbalized understanding and agrees with plan. Pt denies further questions or concerns at this time. Advised calling back with further questions/concern.  Brian Quevedo RN on 7/23/2024 at 8:45 AM

## 2024-09-18 DIAGNOSIS — J30.2 SEASONAL ALLERGIC RHINITIS, UNSPECIFIED TRIGGER: ICD-10-CM

## 2024-09-18 RX ORDER — FLUTICASONE PROPIONATE 50 MCG
2 SPRAY, SUSPENSION (ML) NASAL DAILY
Qty: 48 ML | Refills: 1 | Status: SHIPPED | OUTPATIENT
Start: 2024-09-18

## 2025-03-17 DIAGNOSIS — J30.2 SEASONAL ALLERGIC RHINITIS, UNSPECIFIED TRIGGER: ICD-10-CM

## 2025-03-17 RX ORDER — FLUTICASONE PROPIONATE 50 MCG
2 SPRAY, SUSPENSION (ML) NASAL DAILY
Qty: 48 ML | Refills: 1 | Status: SHIPPED | OUTPATIENT
Start: 2025-03-17

## 2025-06-17 ENCOUNTER — PATIENT OUTREACH (OUTPATIENT)
Dept: CARE COORDINATION | Facility: CLINIC | Age: 45
End: 2025-06-17
Payer: COMMERCIAL

## 2025-07-01 ENCOUNTER — PATIENT OUTREACH (OUTPATIENT)
Dept: CARE COORDINATION | Facility: CLINIC | Age: 45
End: 2025-07-01
Payer: COMMERCIAL

## 2025-08-23 ENCOUNTER — HEALTH MAINTENANCE LETTER (OUTPATIENT)
Age: 45
End: 2025-08-23